# Patient Record
Sex: FEMALE | Race: WHITE | NOT HISPANIC OR LATINO | Employment: OTHER | URBAN - METROPOLITAN AREA
[De-identification: names, ages, dates, MRNs, and addresses within clinical notes are randomized per-mention and may not be internally consistent; named-entity substitution may affect disease eponyms.]

---

## 2017-01-06 ENCOUNTER — HOSPITAL ENCOUNTER (OUTPATIENT)
Dept: PHYSICAL THERAPY | Facility: HOSPITAL | Age: 67
Setting detail: RECURRING SERIES
Discharge: HOME | End: 2017-03-30
Attending: HOSPITALIST | Admitting: HOSPITALIST

## 2017-04-05 ENCOUNTER — HOSPITAL ENCOUNTER (OUTPATIENT)
Dept: PHYSICAL THERAPY | Facility: HOSPITAL | Age: 67
Setting detail: RECURRING SERIES
Discharge: HOME | End: 2017-05-21
Attending: HOSPITALIST | Admitting: HOSPITALIST

## 2017-05-30 ENCOUNTER — HOSPITAL ENCOUNTER (OUTPATIENT)
Dept: FAMILY MEDICINE CLINIC | Facility: CLINIC | Age: 67
Setting detail: SPECIMEN
Discharge: HOME | End: 2017-05-30
Attending: HOSPITALIST | Admitting: HOSPITALIST

## 2017-05-31 LAB
ALBUMIN SERPL-MCNC: 4.2 G/DL (ref 3.5–4.8)
ALBUMIN/GLOB SERPL: 1.9 {RATIO} (ref 1–1.7)
ALP SERPL-CCNC: 67 IU/L (ref 32–91)
ALT SERPL-CCNC: 32 IU/L (ref 14–54)
ANION GAP SERPL CALC-SCNC: 12 MMOL/L (ref 10–20)
AST SERPL-CCNC: 32 IU/L (ref 15–41)
BASOPHILS # BLD AUTO: 0 10*3/UL (ref 0–0.2)
BASOPHILS NFR BLD AUTO: 1 % (ref 0–2)
BILIRUB SERPL-MCNC: 0.6 MG/DL (ref 0.3–1.2)
BUN SERPL-MCNC: 16 MG/DL (ref 8–20)
BUN/CREAT SERPL: 20 (ref 5.4–26.2)
CALCIUM SERPL-MCNC: 8.9 MG/DL (ref 8.9–10.3)
CHLORIDE SERPL-SCNC: 101 MMOL/L (ref 101–111)
CHOLEST SERPL-MCNC: 142 MG/DL
CHOLEST/HDLC SERPL: 1.9 {RATIO}
CONV CO2: 28 MMOL/L (ref 22–32)
CONV LDL CHOLESTEROL DIRECT: 55 MG/DL (ref 0–100)
CONV TOTAL PROTEIN: 6.4 G/DL (ref 6.1–7.9)
CREAT UR-MCNC: 0.8 MG/DL (ref 0.4–1)
DIFFERENTIAL METHOD BLD: (no result)
EOSINOPHIL # BLD AUTO: 0.2 10*3/UL (ref 0–0.3)
EOSINOPHIL # BLD AUTO: 4 % (ref 0–3)
ERYTHROCYTE [DISTWIDTH] IN BLOOD BY AUTOMATED COUNT: 13.5 % (ref 11.5–14.5)
FOLATE SERPL-MCNC: 23 NG/ML (ref 5.9–24.8)
GLOBULIN UR ELPH-MCNC: 2.2 G/DL (ref 2.5–3.8)
GLUCOSE SERPL-MCNC: 98 MG/DL (ref 65–99)
HCT VFR BLD AUTO: 38.9 % (ref 35–49)
HDLC SERPL-MCNC: 76 MG/DL
HGB BLD-MCNC: 13.3 G/DL (ref 12–15)
LDLC/HDLC SERPL: 0.7 {RATIO}
LIPID INTERPRETATION: NORMAL
LYMPHOCYTES # BLD AUTO: 1.6 10*3/UL (ref 0.8–4.8)
LYMPHOCYTES NFR BLD AUTO: 33 % (ref 18–42)
MCH RBC QN AUTO: 32.2 PG (ref 26–32)
MCHC RBC AUTO-ENTMCNC: 34.1 G/DL (ref 32–36)
MCV RBC AUTO: 94.5 FL (ref 80–94)
MONOCYTES # BLD AUTO: 0.4 10*3/UL (ref 0.1–1.3)
MONOCYTES NFR BLD AUTO: 9 % (ref 2–11)
NEUTROPHILS # BLD AUTO: 2.6 10*3/UL (ref 2.3–8.6)
NEUTROPHILS NFR BLD AUTO: 53 % (ref 50–75)
NRBC BLD AUTO-RTO: 0 /100{WBCS}
NRBC/RBC NFR BLD MANUAL: 0 10*3/UL
PLATELET # BLD AUTO: 175 10*3/UL (ref 150–450)
PMV BLD AUTO: 8.3 FL (ref 7.4–10.4)
POTASSIUM SERPL-SCNC: 4 MMOL/L (ref 3.6–5.1)
RBC # BLD AUTO: 4.11 10*6/UL (ref 4–5.4)
SODIUM SERPL-SCNC: 137 MMOL/L (ref 136–144)
T3 SERPL-MCNC: 1.01 NG/ML (ref 0.87–1.78)
T4 FREE SERPL-MCNC: 0.86 NG/DL (ref 0.58–1.64)
TRIGL SERPL-MCNC: 25 MG/DL
TSH SERPL-ACNC: 1.08 UIU/ML (ref 0.34–5.6)
VIT B12 SERPL-MCNC: 387 PG/ML (ref 180–914)
VLDLC SERPL CALC-MCNC: 11.7 MG/DL
WBC # BLD AUTO: 4.8 10*3/UL (ref 4.5–11.5)

## 2018-08-16 ENCOUNTER — HOSPITAL ENCOUNTER (OUTPATIENT)
Dept: FAMILY MEDICINE CLINIC | Facility: CLINIC | Age: 68
Setting detail: SPECIMEN
Discharge: HOME | End: 2018-08-16
Attending: HOSPITALIST | Admitting: HOSPITALIST

## 2018-08-16 LAB
ALBUMIN SERPL-MCNC: 4.3 G/DL (ref 3.5–4.8)
ALBUMIN/GLOB SERPL: 1.7 {RATIO} (ref 1–1.7)
ALP SERPL-CCNC: 62 IU/L (ref 32–91)
ALT SERPL-CCNC: 23 IU/L (ref 14–54)
ANION GAP SERPL CALC-SCNC: 10.8 MMOL/L (ref 10–20)
AST SERPL-CCNC: 31 IU/L (ref 15–41)
BASOPHILS # BLD AUTO: 0 10*3/UL (ref 0–0.2)
BASOPHILS NFR BLD AUTO: 1 % (ref 0–2)
BILIRUB SERPL-MCNC: 0.7 MG/DL (ref 0.3–1.2)
BUN SERPL-MCNC: 10 MG/DL (ref 8–20)
BUN/CREAT SERPL: 12.5 (ref 5.4–26.2)
CALCIUM SERPL-MCNC: 9.7 MG/DL (ref 8.9–10.3)
CHLORIDE SERPL-SCNC: 102 MMOL/L (ref 101–111)
CHOLEST SERPL-MCNC: 157 MG/DL
CHOLEST/HDLC SERPL: 2 {RATIO}
CONV CO2: 30 MMOL/L (ref 22–32)
CONV LDL CHOLESTEROL DIRECT: 63 MG/DL (ref 0–100)
CONV TOTAL PROTEIN: 6.9 G/DL (ref 6.1–7.9)
CREAT UR-MCNC: 0.8 MG/DL (ref 0.4–1)
DIFFERENTIAL METHOD BLD: (no result)
EOSINOPHIL # BLD AUTO: 0.3 10*3/UL (ref 0–0.3)
EOSINOPHIL # BLD AUTO: 5 % (ref 0–3)
ERYTHROCYTE [DISTWIDTH] IN BLOOD BY AUTOMATED COUNT: 13.1 % (ref 11.5–14.5)
GLOBULIN UR ELPH-MCNC: 2.6 G/DL (ref 2.5–3.8)
GLUCOSE SERPL-MCNC: 66 MG/DL (ref 65–99)
HCT VFR BLD AUTO: 42.4 % (ref 35–49)
HDLC SERPL-MCNC: 79 MG/DL
HGB BLD-MCNC: 14.4 G/DL (ref 12–15)
LDLC/HDLC SERPL: 0.8 {RATIO}
LIPID INTERPRETATION: NORMAL
LYMPHOCYTES # BLD AUTO: 1.4 10*3/UL (ref 0.8–4.8)
LYMPHOCYTES NFR BLD AUTO: 29 % (ref 18–42)
MCH RBC QN AUTO: 32.5 PG (ref 26–32)
MCHC RBC AUTO-ENTMCNC: 33.9 G/DL (ref 32–36)
MCV RBC AUTO: 95.8 FL (ref 80–94)
MONOCYTES # BLD AUTO: 0.5 10*3/UL (ref 0.1–1.3)
MONOCYTES NFR BLD AUTO: 11 % (ref 2–11)
NEUTROPHILS # BLD AUTO: 2.8 10*3/UL (ref 2.3–8.6)
NEUTROPHILS NFR BLD AUTO: 54 % (ref 50–75)
NRBC BLD AUTO-RTO: 0 /100{WBCS}
NRBC/RBC NFR BLD MANUAL: 0 10*3/UL
PLATELET # BLD AUTO: 222 10*3/UL (ref 150–450)
PMV BLD AUTO: 8 FL (ref 7.4–10.4)
POTASSIUM SERPL-SCNC: 3.8 MMOL/L (ref 3.6–5.1)
RBC # BLD AUTO: 4.43 10*6/UL (ref 4–5.4)
SODIUM SERPL-SCNC: 139 MMOL/L (ref 136–144)
TRIGL SERPL-MCNC: 46 MG/DL
VLDLC SERPL CALC-MCNC: 14.4 MG/DL
WBC # BLD AUTO: 5 10*3/UL (ref 4.5–11.5)

## 2019-02-20 ENCOUNTER — HOSPITAL ENCOUNTER (OUTPATIENT)
Dept: CARDIOLOGY | Facility: HOSPITAL | Age: 69
Discharge: HOME | End: 2019-02-20
Attending: INTERNAL MEDICINE | Admitting: INTERNAL MEDICINE

## 2019-09-12 RX ORDER — FOLIC ACID 1 MG/1
TABLET ORAL
Qty: 90 TABLET | Refills: 2 | Status: SHIPPED | OUTPATIENT
Start: 2019-09-12 | End: 2020-04-07 | Stop reason: SDUPTHER

## 2019-10-30 ENCOUNTER — OFFICE VISIT (OUTPATIENT)
Dept: FAMILY MEDICINE CLINIC | Facility: CLINIC | Age: 69
End: 2019-10-30

## 2019-10-30 VITALS
DIASTOLIC BLOOD PRESSURE: 64 MMHG | TEMPERATURE: 97.7 F | HEART RATE: 63 BPM | WEIGHT: 129 LBS | SYSTOLIC BLOOD PRESSURE: 104 MMHG | RESPIRATION RATE: 16 BRPM | OXYGEN SATURATION: 96 % | BODY MASS INDEX: 20.82 KG/M2

## 2019-10-30 DIAGNOSIS — I25.10 CORONARY ARTERY DISEASE INVOLVING NATIVE CORONARY ARTERY OF NATIVE HEART WITHOUT ANGINA PECTORIS: Primary | ICD-10-CM

## 2019-10-30 PROCEDURE — 99213 OFFICE O/P EST LOW 20 MIN: CPT | Performed by: INTERNAL MEDICINE

## 2019-10-30 RX ORDER — FLUOXETINE HYDROCHLORIDE 20 MG/1
CAPSULE ORAL
COMMUNITY
Start: 2019-10-14 | End: 2019-11-08

## 2019-10-30 RX ORDER — POLYETHYLENE GLYCOL 3350 17 G/17G
17 POWDER, FOR SOLUTION ORAL
COMMUNITY

## 2019-10-30 RX ORDER — ATORVASTATIN CALCIUM 40 MG/1
TABLET, FILM COATED ORAL
COMMUNITY
Start: 2019-10-04 | End: 2020-04-28 | Stop reason: SDUPTHER

## 2019-11-07 PROBLEM — R01.1 HEART MURMUR: Status: ACTIVE | Noted: 2019-11-07

## 2019-11-07 PROBLEM — Z82.49 FAMILY HISTORY OF CORONARY ARTERY DISEASE: Status: ACTIVE | Noted: 2019-01-21

## 2019-11-07 PROBLEM — R07.89 CHEST PRESSURE: Status: ACTIVE | Noted: 2019-01-17

## 2019-11-07 PROBLEM — E78.5 HYPERLIPIDEMIA: Status: ACTIVE | Noted: 2019-11-07

## 2019-11-08 ENCOUNTER — OFFICE VISIT (OUTPATIENT)
Dept: CARDIOLOGY | Facility: CLINIC | Age: 69
End: 2019-11-08

## 2019-11-08 VITALS
WEIGHT: 128 LBS | HEIGHT: 66 IN | BODY MASS INDEX: 20.57 KG/M2 | DIASTOLIC BLOOD PRESSURE: 82 MMHG | SYSTOLIC BLOOD PRESSURE: 127 MMHG | HEART RATE: 74 BPM | OXYGEN SATURATION: 97 %

## 2019-11-08 DIAGNOSIS — E78.00 PURE HYPERCHOLESTEROLEMIA: Primary | ICD-10-CM

## 2019-11-08 DIAGNOSIS — I25.118 CORONARY ARTERY DISEASE OF NATIVE ARTERY OF NATIVE HEART WITH STABLE ANGINA PECTORIS (HCC): ICD-10-CM

## 2019-11-08 DIAGNOSIS — R94.31 ABNORMAL EKG: ICD-10-CM

## 2019-11-08 DIAGNOSIS — R07.9 CHEST PAIN, UNSPECIFIED TYPE: ICD-10-CM

## 2019-11-08 DIAGNOSIS — R01.1 HEART MURMUR: ICD-10-CM

## 2019-11-08 DIAGNOSIS — R06.02 SHORTNESS OF BREATH: ICD-10-CM

## 2019-11-08 PROCEDURE — 93000 ELECTROCARDIOGRAM COMPLETE: CPT | Performed by: INTERNAL MEDICINE

## 2019-11-08 PROCEDURE — 99204 OFFICE O/P NEW MOD 45 MIN: CPT | Performed by: INTERNAL MEDICINE

## 2019-11-08 NOTE — PROGRESS NOTES
"    Subjective:     Encounter Date:11/08/2019      Patient ID: Breanne Nicole is a 69 y.o. female.    Chief Complaint:  History of Present Illness 69-year-old white female with history of coronary disease hyperlipidemia history of Parkinson disease presents to my office for new consultation.  Patient has occasional episodes of chest pain and she is very nervous.  She also has been having some shortness of breath and dizziness.  No complaints of any palpitations syncope or swelling of the feet.  Patient had a calcium scoring test with a CT scan which was abnormal and hence she was referred to my office.  She also has history of Parkinson disease and using medications.    The following portions of the patient's history were reviewed and updated as appropriate: allergies, current medications, past family history, past medical history, past social history, past surgical history and problem list.  Past Medical History:   Diagnosis Date   • Coronary artery disease    • Heart murmur    • Hyperlipidemia      History reviewed. No pertinent surgical history.  /82 (BP Location: Left arm, Patient Position: Sitting)   Pulse 74   Ht 167.6 cm (66\")   Wt 58.1 kg (128 lb)   SpO2 97%   BMI 20.66 kg/m²   Family History   Problem Relation Age of Onset   • Heart disease Mother    • Heart disease Father    • Heart attack Father    • Heart disease Sister    • Heart attack Sister    • Heart attack Sister        Current Outpatient Medications:   •  atorvastatin (LIPITOR) 40 MG tablet, , Disp: , Rfl:   •  calcium carb-cholecalciferol 600-800 MG-UNIT tablet, Take 1 tablet by mouth., Disp: , Rfl:   •  carbidopa-levodopa (SINEMET)  MG per tablet, , Disp: , Rfl:   •  folic acid (FOLVITE) 1 MG tablet, TAKE ONE TABLET BY MOUTH DAILY, Disp: 90 tablet, Rfl: 2  •  polyethylene glycol (MIRALAX) packet, Take 17 g by mouth., Disp: , Rfl:   Allergies   Allergen Reactions   • Codeine Nausea Only   • Penicillins Diarrhea and Nausea And " Vomiting     Social History     Socioeconomic History   • Marital status:      Spouse name: Not on file   • Number of children: Not on file   • Years of education: Not on file   • Highest education level: Not on file   Tobacco Use   • Smoking status: Never Smoker   • Smokeless tobacco: Never Used     Review of Systems   Constitution: Negative for fever and malaise/fatigue.   HENT: Negative for ear pain and nosebleeds.    Eyes: Negative for blurred vision and double vision.   Cardiovascular: Positive for chest pain. Negative for dyspnea on exertion and palpitations.   Respiratory: Positive for shortness of breath. Negative for cough.    Skin: Negative for rash.   Musculoskeletal: Negative for joint pain.   Gastrointestinal: Positive for nausea. Negative for abdominal pain and vomiting.   Neurological: Positive for dizziness and light-headedness. Negative for focal weakness and headaches.   Psychiatric/Behavioral: Negative for depression. The patient is not nervous/anxious.    All other systems reviewed and are negative.             Objective:     Physical Exam   Constitutional: She appears well-developed and well-nourished.   HENT:   Head: Normocephalic and atraumatic.   Eyes: Conjunctivae and EOM are normal. Pupils are equal, round, and reactive to light. No scleral icterus.   Neck: Normal range of motion. Neck supple. No JVD present. Carotid bruit is not present.   Cardiovascular: Normal rate, regular rhythm, S1 normal, S2 normal, normal heart sounds and intact distal pulses. PMI is not displaced.   Pulmonary/Chest: Effort normal and breath sounds normal. She has no wheezes. She has no rales.   Abdominal: Soft. Bowel sounds are normal.   Musculoskeletal: Normal range of motion.   Neurological: She is alert. She has normal strength.   No focal deficits   Skin: Skin is warm and dry. No rash noted.   Psychiatric: She has a normal mood and affect.       ECG 12 Lead  Date/Time: 11/8/2019 3:14 PM  Performed by:  Zaire Ohara MD  Authorized by: Zaire Ohara MD   Comments: Sinus rhythm with left axis deviation and old septal MI no previous EKGs available            Lab Review:       Assessment:          Diagnosis Plan   1. Pure hypercholesterolemia     2. Coronary artery disease of native artery of native heart with stable angina pectoris (CMS/HCC)     3. Heart murmur     4. Shortness of breath     5. Chest pain, unspecified type     6. Abnormal EKG            Plan:       Patient has history of coronary disease in the past and now has a CT scan of the chest with calcium score being high  Patient with occasional episodes of chest pain shortness of breath which are nonspecific but also has a heart murmur  We will check an echocardiogram for LV function valve abnormalities  We will also perform a stress Myoview study.  Patient will have a Lexiscan Myoview study because she has Parkinson's disease  Patient has an abnormal EKG.  Patient lipids are followed by the primary care doctor

## 2019-11-12 ENCOUNTER — TELEPHONE (OUTPATIENT)
Dept: FAMILY MEDICINE CLINIC | Facility: CLINIC | Age: 69
End: 2019-11-12

## 2019-11-13 ENCOUNTER — HOSPITAL ENCOUNTER (OUTPATIENT)
Dept: CARDIOLOGY | Facility: HOSPITAL | Age: 69
Discharge: HOME | End: 2019-11-13

## 2019-11-13 ENCOUNTER — HOSPITAL ENCOUNTER (OUTPATIENT)
Dept: CARDIOLOGY | Facility: HOSPITAL | Age: 69
Discharge: HOME | End: 2019-11-13
Admitting: INTERNAL MEDICINE

## 2019-11-13 VITALS
WEIGHT: 125 LBS | SYSTOLIC BLOOD PRESSURE: 113 MMHG | DIASTOLIC BLOOD PRESSURE: 70 MMHG | HEIGHT: 66 IN | BODY MASS INDEX: 20.09 KG/M2

## 2019-11-13 DIAGNOSIS — E78.00 PURE HYPERCHOLESTEROLEMIA: ICD-10-CM

## 2019-11-13 DIAGNOSIS — R07.9 CHEST PAIN, UNSPECIFIED TYPE: ICD-10-CM

## 2019-11-13 DIAGNOSIS — R01.1 HEART MURMUR: ICD-10-CM

## 2019-11-13 DIAGNOSIS — I25.118 CORONARY ARTERY DISEASE OF NATIVE ARTERY OF NATIVE HEART WITH STABLE ANGINA PECTORIS (HCC): ICD-10-CM

## 2019-11-13 DIAGNOSIS — R94.31 ABNORMAL EKG: ICD-10-CM

## 2019-11-13 DIAGNOSIS — R06.02 SHORTNESS OF BREATH: ICD-10-CM

## 2019-11-13 LAB
BH CV ECHO MEAS - ACS: 2.2 CM
BH CV ECHO MEAS - AO MAX PG (FULL): 1.9 MMHG
BH CV ECHO MEAS - AO MAX PG: 4.5 MMHG
BH CV ECHO MEAS - AO MEAN PG (FULL): 1.1 MMHG
BH CV ECHO MEAS - AO MEAN PG: 2.7 MMHG
BH CV ECHO MEAS - AO ROOT AREA: 8.5 CM^2
BH CV ECHO MEAS - AO ROOT DIAM: 3.3 CM
BH CV ECHO MEAS - AO V2 MAX: 106 CM/SEC
BH CV ECHO MEAS - AO V2 MEAN: 79.7 CM/SEC
BH CV ECHO MEAS - AO V2 VTI: 23.9 CM
BH CV ECHO MEAS - ASC AORTA: 4.1 CM
BH CV ECHO MEAS - AVA(I,A): 2.5 CM^2
BH CV ECHO MEAS - AVA(I,D): 2.5 CM^2
BH CV ECHO MEAS - AVA(V,A): 2.6 CM^2
BH CV ECHO MEAS - AVA(V,D): 2.6 CM^2
BH CV ECHO MEAS - EDV(CUBED): 72 ML
BH CV ECHO MEAS - EDV(MOD-SP4): 43.9 ML
BH CV ECHO MEAS - EDV(TEICH): 76.9 ML
BH CV ECHO MEAS - EF(CUBED): 74.2 %
BH CV ECHO MEAS - EF(MOD-SP4): 62.5 %
BH CV ECHO MEAS - EF(TEICH): 66.5 %
BH CV ECHO MEAS - ESV(CUBED): 18.6 ML
BH CV ECHO MEAS - ESV(MOD-SP4): 16.5 ML
BH CV ECHO MEAS - ESV(TEICH): 25.8 ML
BH CV ECHO MEAS - FS: 36.4 %
BH CV ECHO MEAS - IVS/LVPW: 0.8
BH CV ECHO MEAS - IVSD: 0.69 CM
BH CV ECHO MEAS - LA DIMENSION: 4 CM
BH CV ECHO MEAS - LA/AO: 1.2
BH CV ECHO MEAS - LV MASS(C)D: 96 GRAMS
BH CV ECHO MEAS - LV MAX PG: 2.6 MMHG
BH CV ECHO MEAS - LV MEAN PG: 1.6 MMHG
BH CV ECHO MEAS - LV V1 MAX: 80.8 CM/SEC
BH CV ECHO MEAS - LV V1 MEAN: 60.4 CM/SEC
BH CV ECHO MEAS - LV V1 VTI: 18 CM
BH CV ECHO MEAS - LVIDD: 4.2 CM
BH CV ECHO MEAS - LVIDS: 2.6 CM
BH CV ECHO MEAS - LVOT AREA: 3.4 CM^2
BH CV ECHO MEAS - LVOT DIAM: 2.1 CM
BH CV ECHO MEAS - LVPWD: 0.87 CM
BH CV ECHO MEAS - MV A MAX VEL: 86.9 CM/SEC
BH CV ECHO MEAS - MV DEC SLOPE: 444.7 CM/SEC^2
BH CV ECHO MEAS - MV DEC TIME: 0.18 SEC
BH CV ECHO MEAS - MV E MAX VEL: 78.3 CM/SEC
BH CV ECHO MEAS - MV E/A: 0.9
BH CV ECHO MEAS - MV MAX PG: 4 MMHG
BH CV ECHO MEAS - MV MEAN PG: 2.1 MMHG
BH CV ECHO MEAS - MV V2 MAX: 100.2 CM/SEC
BH CV ECHO MEAS - MV V2 MEAN: 70.2 CM/SEC
BH CV ECHO MEAS - MV V2 VTI: 22.8 CM
BH CV ECHO MEAS - MVA(VTI): 2.6 CM^2
BH CV ECHO MEAS - PA ACC TIME: 0.11 SEC
BH CV ECHO MEAS - PA PR(ACCEL): 29.2 MMHG
BH CV ECHO MEAS - RAP SYSTOLE: 3 MMHG
BH CV ECHO MEAS - RV MAX PG: 1.5 MMHG
BH CV ECHO MEAS - RV MEAN PG: 1.1 MMHG
BH CV ECHO MEAS - RV V1 MAX: 60.3 CM/SEC
BH CV ECHO MEAS - RV V1 MEAN: 50.3 CM/SEC
BH CV ECHO MEAS - RV V1 VTI: 13.8 CM
BH CV ECHO MEAS - RVDD: 2.6 CM
BH CV ECHO MEAS - RVSP: 20.5 MMHG
BH CV ECHO MEAS - SV(AO): 204.3 ML
BH CV ECHO MEAS - SV(CUBED): 53.5 ML
BH CV ECHO MEAS - SV(LVOT): 60.3 ML
BH CV ECHO MEAS - SV(MOD-SP4): 27.5 ML
BH CV ECHO MEAS - SV(TEICH): 51.1 ML
BH CV ECHO MEAS - TR MAX VEL: 208.8 CM/SEC
BH CV NUCLEAR PRIOR STUDY: 3
BH CV STRESS COMMENTS STAGE 1: NORMAL
BH CV STRESS DOSE REGADENOSON STAGE 1: 0.4
BH CV STRESS DURATION MIN STAGE 1: 0
BH CV STRESS DURATION SEC STAGE 1: 10
BH CV STRESS PROTOCOL 1: NORMAL
BH CV STRESS RECOVERY BP: NORMAL MMHG
BH CV STRESS RECOVERY HR: 117 BPM
BH CV STRESS STAGE 1: 1
LV EF NUC BP: 73 %
MAXIMAL PREDICTED HEART RATE: 152 BPM
MAXIMAL PREDICTED HEART RATE: 152 BPM
PERCENT MAX PREDICTED HR: 80.26 %
STRESS BASELINE BP: NORMAL MMHG
STRESS BASELINE HR: 70 BPM
STRESS PERCENT HR: 94 %
STRESS POST PEAK BP: NORMAL MMHG
STRESS POST PEAK HR: 122 BPM
STRESS TARGET HR: 129 BPM
STRESS TARGET HR: 129 BPM

## 2019-11-13 PROCEDURE — 78452 HT MUSCLE IMAGE SPECT MULT: CPT

## 2019-11-13 PROCEDURE — 93016 CV STRESS TEST SUPVJ ONLY: CPT | Performed by: INTERNAL MEDICINE

## 2019-11-13 PROCEDURE — 25010000002 REGADENOSON 0.4 MG/5ML SOLUTION: Performed by: INTERNAL MEDICINE

## 2019-11-13 PROCEDURE — 93017 CV STRESS TEST TRACING ONLY: CPT

## 2019-11-13 PROCEDURE — 93306 TTE W/DOPPLER COMPLETE: CPT

## 2019-11-13 PROCEDURE — A9500 TC99M SESTAMIBI: HCPCS | Performed by: INTERNAL MEDICINE

## 2019-11-13 PROCEDURE — 0 TECHNETIUM SESTAMIBI: Performed by: INTERNAL MEDICINE

## 2019-11-13 PROCEDURE — 93018 CV STRESS TEST I&R ONLY: CPT | Performed by: INTERNAL MEDICINE

## 2019-11-13 PROCEDURE — 93306 TTE W/DOPPLER COMPLETE: CPT | Performed by: INTERNAL MEDICINE

## 2019-11-13 PROCEDURE — 78452 HT MUSCLE IMAGE SPECT MULT: CPT | Performed by: INTERNAL MEDICINE

## 2019-11-13 RX ADMIN — TECHNETIUM TC 99M SESTAMIBI 1 DOSE: 1 INJECTION INTRAVENOUS at 10:20

## 2019-11-13 RX ADMIN — TECHNETIUM TC 99M SESTAMIBI 1 DOSE: 1 INJECTION INTRAVENOUS at 08:00

## 2019-11-13 RX ADMIN — REGADENOSON 0.4 MG: 0.08 INJECTION, SOLUTION INTRAVENOUS at 10:20

## 2020-01-13 ENCOUNTER — OFFICE VISIT (OUTPATIENT)
Dept: FAMILY MEDICINE CLINIC | Facility: CLINIC | Age: 70
End: 2020-01-13

## 2020-01-13 VITALS
HEART RATE: 64 BPM | BODY MASS INDEX: 20.34 KG/M2 | SYSTOLIC BLOOD PRESSURE: 110 MMHG | TEMPERATURE: 98.5 F | WEIGHT: 126 LBS | RESPIRATION RATE: 8 BRPM | DIASTOLIC BLOOD PRESSURE: 70 MMHG

## 2020-01-13 DIAGNOSIS — J01.00 SUBACUTE MAXILLARY SINUSITIS: Primary | ICD-10-CM

## 2020-01-13 PROCEDURE — 99213 OFFICE O/P EST LOW 20 MIN: CPT | Performed by: NURSE PRACTITIONER

## 2020-01-13 RX ORDER — DOXYCYCLINE HYCLATE 100 MG
100 TABLET ORAL 2 TIMES DAILY
Qty: 20 TABLET | Refills: 0 | Status: SHIPPED | OUTPATIENT
Start: 2020-01-13 | End: 2020-01-23

## 2020-01-13 NOTE — PROGRESS NOTES
Subjective   Breanne Nicole is a 69 y.o. female.     Chief Complaint   Patient presents with   • URI       /70 (BP Location: Left arm, Patient Position: Sitting, Cuff Size: Adult)   Pulse 64   Temp 98.5 °F (36.9 °C) (Oral)   Resp 8   Wt 57.2 kg (126 lb)   BMI 20.34 kg/m²     BP Readings from Last 3 Encounters:   01/13/20 110/70   11/13/19 113/70   11/08/19 127/82       Wt Readings from Last 3 Encounters:   01/13/20 57.2 kg (126 lb)   11/13/19 56.7 kg (125 lb)   11/08/19 58.1 kg (128 lb)       Pt comes in today with c/o sinus pressure and pain. Symptoms started on 12/27. Was using flonase and humidifier at home. Symptoms improved after 3 days, but then returned. Has had 4 plane rides and the flying has aggravated symptoms. Sinus tenderness and pain on right side. Both maxillary and around right eye. Hasn't had much nasal drainage anymore. When she does will have thick mucous production in the morning. No cough. No fever or chills.  Has tried flonase and IBU.        The following portions of the patient's history were reviewed and updated as appropriate: allergies, current medications, past family history, past medical history, past social history, past surgical history and problem list.    Review of Systems   Constitutional: Negative for chills and fever.   HENT: Positive for congestion, postnasal drip and sinus pressure. Negative for ear pain, rhinorrhea, sneezing, sore throat and swollen glands.    Respiratory: Negative for cough, chest tightness, shortness of breath and wheezing.    Gastrointestinal: Negative for nausea and vomiting.   Neurological: Positive for headache.       Objective   Physical Exam   Constitutional: She is oriented to person, place, and time. She appears well-developed and well-nourished.   HENT:   Right Ear: Tympanic membrane normal.   Left Ear: Tympanic membrane normal.   Nose: Mucosal edema present. No rhinorrhea. Right sinus exhibits maxillary sinus tenderness and frontal sinus  tenderness. Left sinus exhibits maxillary sinus tenderness. Left sinus exhibits no frontal sinus tenderness.   Mouth/Throat: Oropharynx is clear and moist.   Eyes: Pupils are equal, round, and reactive to light.   Cardiovascular: Normal rate and regular rhythm.   Pulmonary/Chest: Effort normal and breath sounds normal.   Neurological: She is alert and oriented to person, place, and time.     Diagnoses and all orders for this visit:    1. Subacute maxillary sinusitis (Primary)  -     doxycycline (VIBRAMYICN) 100 MG tablet; Take 1 tablet by mouth 2 (Two) Times a Day for 10 days.  Dispense: 20 tablet; Refill: 0    start anbx  flonase otc  advil cold and sinus  During this office visit, we discussed the pertinent aspects of the visit and treatment recommendations. Pt verbalizes understanding. Follow up was discussed. Patient was given the opportunity to ask questions and discuss other concerns.     Return if symptoms worsen or fail to improve.

## 2020-02-06 ENCOUNTER — TELEPHONE (OUTPATIENT)
Dept: FAMILY MEDICINE CLINIC | Facility: CLINIC | Age: 70
End: 2020-02-06

## 2020-02-06 DIAGNOSIS — E78.5 HYPERLIPIDEMIA, UNSPECIFIED HYPERLIPIDEMIA TYPE: ICD-10-CM

## 2020-02-06 DIAGNOSIS — I25.10 CORONARY ARTERY DISEASE INVOLVING NATIVE CORONARY ARTERY OF NATIVE HEART WITHOUT ANGINA PECTORIS: ICD-10-CM

## 2020-02-06 DIAGNOSIS — C83.00 SMALL B-CELL LYMPHOMA, UNSPECIFIED BODY REGION (HCC): ICD-10-CM

## 2020-02-06 DIAGNOSIS — R53.83 FATIGUE, UNSPECIFIED TYPE: Primary | ICD-10-CM

## 2020-02-06 DIAGNOSIS — M85.80 OSTEOPENIA, UNSPECIFIED LOCATION: ICD-10-CM

## 2020-04-07 RX ORDER — FOLIC ACID 1 MG/1
1000 TABLET ORAL DAILY
Qty: 90 TABLET | Refills: 2 | Status: SHIPPED | OUTPATIENT
Start: 2020-04-07 | End: 2021-05-26 | Stop reason: ALTCHOICE

## 2020-04-15 ENCOUNTER — TELEPHONE (OUTPATIENT)
Dept: FAMILY MEDICINE CLINIC | Facility: CLINIC | Age: 70
End: 2020-04-15

## 2020-04-15 NOTE — TELEPHONE ENCOUNTER
----- Message from Breanne Nicole sent at 4/14/2020  8:40 PM EDT -----  Regarding: RE: Non-Urgent Medical Question  Contact: [phone]  Ok, I will do my 4-28 appointment with Dr. Rendon by e-visit and do blood work later. Thanks  ----- Message -----  From: Bobbi Rendon MD  Sent: 4/14/2020 10:37 AM EDT  To: Breanne Nicole  Subject: RE: Non-Urgent Medical Question  I understand.  We can do your wellness on an video visit at the same time on 4/28 if you want and then do the labs as soon as we can in the summer.  You just need to have mychart-- which you already do, and a smart phone or computer that we can do the video on if you want to do that still.  I would be happy to convert your appointment.    Dr. Rendon    ----- Message -----     From: Breanne Nicole     Sent: 4/14/2020 10:05 AM EDT       To: Bobbi Rendon MD  Subject: Non-Urgent Medical Question    I do not feel comfortable coming in for the routine blood work scheduled for 4-21. I also don't feel comfortable coming in for my wellness check on 4-28.  I can't be exposed because I need to stay well in order to care for grandchildren while their mom is having a baby. I guess the best thing is to reschedule for late July or August.

## 2020-04-28 ENCOUNTER — TELEMEDICINE (OUTPATIENT)
Dept: FAMILY MEDICINE CLINIC | Facility: CLINIC | Age: 70
End: 2020-04-28

## 2020-04-28 VITALS
WEIGHT: 120 LBS | BODY MASS INDEX: 19.37 KG/M2 | SYSTOLIC BLOOD PRESSURE: 114 MMHG | HEART RATE: 74 BPM | DIASTOLIC BLOOD PRESSURE: 79 MMHG

## 2020-04-28 DIAGNOSIS — G20 PARKINSON'S DISEASE (HCC): ICD-10-CM

## 2020-04-28 DIAGNOSIS — Z00.00 ENCOUNTER FOR GENERAL ADULT MEDICAL EXAMINATION WITHOUT ABNORMAL FINDINGS: Primary | ICD-10-CM

## 2020-04-28 DIAGNOSIS — E78.5 HYPERLIPIDEMIA, UNSPECIFIED HYPERLIPIDEMIA TYPE: ICD-10-CM

## 2020-04-28 PROCEDURE — G0439 PPPS, SUBSEQ VISIT: HCPCS | Performed by: INTERNAL MEDICINE

## 2020-04-28 RX ORDER — ATORVASTATIN CALCIUM 40 MG/1
40 TABLET, FILM COATED ORAL DAILY
Qty: 90 TABLET | Refills: 1 | Status: SHIPPED | OUTPATIENT
Start: 2020-04-28 | End: 2020-10-22

## 2020-04-28 RX ORDER — VITAMIN B COMPLEX
TABLET ORAL
COMMUNITY
Start: 2019-04-01 | End: 2021-05-26 | Stop reason: ALTCHOICE

## 2020-05-05 ENCOUNTER — TELEPHONE (OUTPATIENT)
Dept: FAMILY MEDICINE CLINIC | Facility: CLINIC | Age: 70
End: 2020-05-05

## 2020-05-05 NOTE — TELEPHONE ENCOUNTER
Pt needs her immunization record that shows when she had pertussis vaccine mailed to her home. Thank you.

## 2020-05-10 PROBLEM — G20.A1 PARKINSON'S DISEASE: Status: ACTIVE | Noted: 2017-05-30

## 2020-05-10 PROBLEM — K58.9 IRRITABLE BOWEL SYNDROME: Status: ACTIVE | Noted: 2020-05-10

## 2020-05-10 PROBLEM — G20 PARKINSON'S DISEASE: Status: ACTIVE | Noted: 2017-05-30

## 2020-05-10 PROBLEM — L71.9 ROSACEA: Status: ACTIVE | Noted: 2020-05-10

## 2020-05-10 PROBLEM — K59.00 CONSTIPATION: Status: ACTIVE | Noted: 2017-10-30

## 2020-05-10 PROBLEM — M81.0 OSTEOPOROSIS: Status: ACTIVE | Noted: 2020-05-10

## 2020-05-10 PROBLEM — M19.90 OSTEOARTHRITIS: Status: ACTIVE | Noted: 2020-05-10

## 2020-05-10 PROBLEM — T78.40XA ALLERGIC STATE: Status: ACTIVE | Noted: 2020-05-10

## 2020-09-29 ENCOUNTER — TELEPHONE (OUTPATIENT)
Dept: FAMILY MEDICINE CLINIC | Facility: CLINIC | Age: 70
End: 2020-09-29

## 2020-09-29 NOTE — TELEPHONE ENCOUNTER
[facility] with [facility].  Lab orders in.

## 2020-10-06 RX ORDER — PROPRANOLOL HYDROCHLORIDE 20 MG/1
20 TABLET ORAL 3 TIMES DAILY
COMMUNITY
Start: 2020-07-25 | End: 2022-02-16

## 2020-10-08 ENCOUNTER — LAB (OUTPATIENT)
Dept: FAMILY MEDICINE CLINIC | Facility: CLINIC | Age: 70
End: 2020-10-08

## 2020-10-08 DIAGNOSIS — R53.83 FATIGUE, UNSPECIFIED TYPE: ICD-10-CM

## 2020-10-08 DIAGNOSIS — E78.5 HYPERLIPIDEMIA, UNSPECIFIED HYPERLIPIDEMIA TYPE: ICD-10-CM

## 2020-10-08 DIAGNOSIS — C83.00 SMALL B-CELL LYMPHOMA, UNSPECIFIED BODY REGION (HCC): ICD-10-CM

## 2020-10-08 DIAGNOSIS — M85.80 OSTEOPENIA, UNSPECIFIED LOCATION: ICD-10-CM

## 2020-10-08 DIAGNOSIS — I25.10 CORONARY ARTERY DISEASE INVOLVING NATIVE CORONARY ARTERY OF NATIVE HEART WITHOUT ANGINA PECTORIS: ICD-10-CM

## 2020-10-08 LAB
25(OH)D3 SERPL-MCNC: 43.9 NG/ML (ref 30–100)
ALBUMIN SERPL-MCNC: 4.4 G/DL (ref 3.5–5.2)
ALBUMIN/GLOB SERPL: 1.8 G/DL
ALP SERPL-CCNC: 67 U/L (ref 39–117)
ALT SERPL W P-5'-P-CCNC: 6 U/L (ref 1–33)
ANION GAP SERPL CALCULATED.3IONS-SCNC: 7.2 MMOL/L (ref 5–15)
AST SERPL-CCNC: 29 U/L (ref 1–32)
BASOPHILS # BLD AUTO: 0.03 10*3/MM3 (ref 0–0.2)
BASOPHILS NFR BLD AUTO: 0.8 % (ref 0–1.5)
BILIRUB SERPL-MCNC: 0.4 MG/DL (ref 0–1.2)
BUN SERPL-MCNC: 15 MG/DL (ref 8–23)
BUN/CREAT SERPL: 21.1 (ref 7–25)
CALCIUM SPEC-SCNC: 9.2 MG/DL (ref 8.6–10.5)
CHLORIDE SERPL-SCNC: 102 MMOL/L (ref 98–107)
CHOLEST SERPL-MCNC: 146 MG/DL (ref 0–200)
CO2 SERPL-SCNC: 28.8 MMOL/L (ref 22–29)
CREAT SERPL-MCNC: 0.71 MG/DL (ref 0.57–1)
DEPRECATED RDW RBC AUTO: 41.7 FL (ref 37–54)
EOSINOPHIL # BLD AUTO: 0.32 10*3/MM3 (ref 0–0.4)
EOSINOPHIL NFR BLD AUTO: 8 % (ref 0.3–6.2)
ERYTHROCYTE [DISTWIDTH] IN BLOOD BY AUTOMATED COUNT: 11.8 % (ref 12.3–15.4)
GFR SERPL CREATININE-BSD FRML MDRD: 82 ML/MIN/1.73
GLOBULIN UR ELPH-MCNC: 2.4 GM/DL
GLUCOSE SERPL-MCNC: 87 MG/DL (ref 65–99)
HCT VFR BLD AUTO: 41.5 % (ref 34–46.6)
HDLC SERPL-MCNC: 79 MG/DL (ref 40–60)
HGB BLD-MCNC: 14.2 G/DL (ref 12–15.9)
IMM GRANULOCYTES # BLD AUTO: 0 10*3/MM3 (ref 0–0.05)
IMM GRANULOCYTES NFR BLD AUTO: 0 % (ref 0–0.5)
LDLC SERPL CALC-MCNC: 59 MG/DL (ref 0–100)
LDLC/HDLC SERPL: 0.77 {RATIO}
LYMPHOCYTES # BLD AUTO: 1.31 10*3/MM3 (ref 0.7–3.1)
LYMPHOCYTES NFR BLD AUTO: 32.9 % (ref 19.6–45.3)
MCH RBC QN AUTO: 33.1 PG (ref 26.6–33)
MCHC RBC AUTO-ENTMCNC: 34.2 G/DL (ref 31.5–35.7)
MCV RBC AUTO: 96.7 FL (ref 79–97)
MONOCYTES # BLD AUTO: 0.41 10*3/MM3 (ref 0.1–0.9)
MONOCYTES NFR BLD AUTO: 10.3 % (ref 5–12)
NEUTROPHILS NFR BLD AUTO: 1.91 10*3/MM3 (ref 1.7–7)
NEUTROPHILS NFR BLD AUTO: 48 % (ref 42.7–76)
NRBC BLD AUTO-RTO: 0 /100 WBC (ref 0–0.2)
PLATELET # BLD AUTO: 202 10*3/MM3 (ref 140–450)
PMV BLD AUTO: 10.2 FL (ref 6–12)
POTASSIUM SERPL-SCNC: 4.1 MMOL/L (ref 3.5–5.2)
PROT SERPL-MCNC: 6.8 G/DL (ref 6–8.5)
RBC # BLD AUTO: 4.29 10*6/MM3 (ref 3.77–5.28)
SODIUM SERPL-SCNC: 138 MMOL/L (ref 136–145)
TRIGL SERPL-MCNC: 29 MG/DL (ref 0–150)
TSH SERPL DL<=0.05 MIU/L-ACNC: 1.8 UIU/ML (ref 0.27–4.2)
VLDLC SERPL-MCNC: 8 MG/DL (ref 5–40)
WBC # BLD AUTO: 3.98 10*3/MM3 (ref 3.4–10.8)

## 2020-10-08 PROCEDURE — 85025 COMPLETE CBC W/AUTO DIFF WBC: CPT | Performed by: INTERNAL MEDICINE

## 2020-10-08 PROCEDURE — 36415 COLL VENOUS BLD VENIPUNCTURE: CPT

## 2020-10-08 PROCEDURE — 80061 LIPID PANEL: CPT | Performed by: INTERNAL MEDICINE

## 2020-10-08 PROCEDURE — 84443 ASSAY THYROID STIM HORMONE: CPT | Performed by: INTERNAL MEDICINE

## 2020-10-08 PROCEDURE — 80053 COMPREHEN METABOLIC PANEL: CPT | Performed by: INTERNAL MEDICINE

## 2020-10-08 PROCEDURE — 82306 VITAMIN D 25 HYDROXY: CPT | Performed by: INTERNAL MEDICINE

## 2020-10-21 ENCOUNTER — PATIENT MESSAGE (OUTPATIENT)
Dept: FAMILY MEDICINE CLINIC | Facility: CLINIC | Age: 70
End: 2020-10-21

## 2020-10-21 ENCOUNTER — OFFICE VISIT (OUTPATIENT)
Dept: CARDIOLOGY | Facility: CLINIC | Age: 70
End: 2020-10-21

## 2020-10-21 VITALS
DIASTOLIC BLOOD PRESSURE: 73 MMHG | SYSTOLIC BLOOD PRESSURE: 107 MMHG | OXYGEN SATURATION: 98 % | BODY MASS INDEX: 18.96 KG/M2 | WEIGHT: 118 LBS | HEIGHT: 66 IN | HEART RATE: 73 BPM

## 2020-10-21 DIAGNOSIS — I25.118 CORONARY ARTERY DISEASE OF NATIVE ARTERY OF NATIVE HEART WITH STABLE ANGINA PECTORIS (HCC): ICD-10-CM

## 2020-10-21 DIAGNOSIS — E78.5 HYPERLIPIDEMIA, UNSPECIFIED HYPERLIPIDEMIA TYPE: Primary | ICD-10-CM

## 2020-10-21 DIAGNOSIS — E78.00 PURE HYPERCHOLESTEROLEMIA: Primary | ICD-10-CM

## 2020-10-21 PROCEDURE — 99213 OFFICE O/P EST LOW 20 MIN: CPT | Performed by: INTERNAL MEDICINE

## 2020-10-21 NOTE — PROGRESS NOTES
"    Subjective:     Encounter Date:10/21/2020      Patient ID: Breanne Nicole is a 70 y.o. female.    Chief Complaint:  History of Present Illness 70-year-old white female with history of coronary disease hyperlipidemia presents to my office for follow-up.  Pain is currently stable without evidence of chest pain or shortness of breath at rest on exertion.  No complains of any PND or orthopnea.  She has occasional palpitation without any dizziness or syncope.  She is taking her medicines regularly.  She does not smoke.  She is trying to exercise regularly she follows a good diet.    The following portions of the patient's history were reviewed and updated as appropriate: allergies, current medications, past family history, past medical history, past social history, past surgical history and problem list.  Past Medical History:   Diagnosis Date   • Anxiety Better    Quit taking meds   • Cancer (CMS/HCC)     Cancer free 5 years   • Coronary artery disease    • Heart murmur    • Hyperlipidemia    • Osteopenia     ?   • Parkinson's disease (CMS/HCC)    • Tremor     PD     Past Surgical History:   Procedure Laterality Date   • LYMPH NODE BIOPSY  2015     /73 (BP Location: Left arm, Patient Position: Sitting)   Pulse 73   Ht 167.6 cm (66\")   Wt 53.5 kg (118 lb)   SpO2 98%   BMI 19.05 kg/m²   Family History   Problem Relation Age of Onset   • Heart disease Mother         CHF, heart attacks   • Heart disease Father         Heart desease   • Heart attack Father    • Heart disease Sister         Heart attack age60   • Heart attack Sister    • Heart attack Sister    • Heart disease Sister        Current Outpatient Medications:   •  atorvastatin (LIPITOR) 40 MG tablet, Take 1 tablet by mouth Daily., Disp: 90 tablet, Rfl: 1  •  B Complex Vitamins (B-COMPLEX/B-12) tablet, , Disp: , Rfl:   •  calcium carb-cholecalciferol 600-800 MG-UNIT tablet, Take 1 tablet by mouth., Disp: , Rfl:   •  carbidopa-levodopa (SINEMET) "  MG per tablet, , Disp: , Rfl:   •  folic acid (FOLVITE) 1 MG tablet, Take 1 tablet by mouth Daily., Disp: 90 tablet, Rfl: 2  •  Magnesium 100 MG capsule, Take  by mouth., Disp: , Rfl:   •  polyethylene glycol (MIRALAX) packet, Take 17 g by mouth., Disp: , Rfl:   •  propranolol (INDERAL) 20 MG tablet, Takes 1/2 tablet up to 3 times a day as needed, Disp: , Rfl:   Allergies   Allergen Reactions   • Codeine Nausea Only   • Penicillins Diarrhea and Nausea And Vomiting     Social History     Socioeconomic History   • Marital status:      Spouse name: Not on file   • Number of children: Not on file   • Years of education: Not on file   • Highest education level: Not on file   Tobacco Use   • Smoking status: Never Smoker   • Smokeless tobacco: Never Used   Substance and Sexual Activity   • Alcohol use: Yes     Frequency: Monthly or less     Comment: 3oz 3-4 days/week   • Drug use: Never     Review of Systems   Constitution: Negative for fever and malaise/fatigue.   Cardiovascular: Positive for palpitations. Negative for chest pain and dyspnea on exertion.   Respiratory: Negative for cough and shortness of breath.    Skin: Negative for rash.   Gastrointestinal: Negative for abdominal pain, nausea and vomiting.   Neurological: Negative for dizziness, focal weakness, headaches and light-headedness.   All other systems reviewed and are negative.             Objective:     Constitutional:       Appearance: Well-developed.   Eyes:      General: No scleral icterus.     Conjunctiva/sclera: Conjunctivae normal.   HENT:      Head: Normocephalic and atraumatic.   Neck:      Musculoskeletal: Normal range of motion and neck supple.      Vascular: No carotid bruit or JVD.   Pulmonary:      Effort: Pulmonary effort is normal.      Breath sounds: Normal breath sounds. No wheezing. No rales.   Cardiovascular:      Normal rate. Regular rhythm.   Pulses:     Intact distal pulses.   Abdominal:      General: Bowel sounds are  normal.      Palpations: Abdomen is soft.   Skin:     General: Skin is warm and dry.      Findings: No rash.   Neurological:      Mental Status: Alert.       Procedures    Lab Review:       Assessment:          Diagnosis Plan   1. Pure hypercholesterolemia     2. Coronary artery disease of native artery of native heart with stable angina pectoris (CMS/HCC)            Plan:       Patient has nonobstructive coronary artery disease with normal LV function is currently stable on medical therapy  Patient has hyperlipidemia and is on a statin and her lipid levels are followed by the primary care doctor  Patient has anxiety disorder and has occasional palpitations but is currently on beta-blockers  Continue current medicines and follow in 6 months

## 2020-10-22 RX ORDER — ATORVASTATIN CALCIUM 20 MG/1
20 TABLET, FILM COATED ORAL DAILY
Qty: 90 TABLET | Refills: 1 | Status: SHIPPED | OUTPATIENT
Start: 2020-10-22 | End: 2021-03-23

## 2020-10-22 NOTE — TELEPHONE ENCOUNTER
From: Breanne Nicole  To: Bobbi Rendon MD  Sent: 10/21/2020 11:09 AM EDT  Subject: Test Results Question    Dr. Ohara reviewed my recent blood work results. He suggested that I ask you about cutting Atoravastitin to 20 mg instead of 40mg. He suggested I get the cholesterol tests redrawn in 6 mo. Can you order a retest on or before May 20? Thank you.

## 2021-01-22 RX ORDER — ATORVASTATIN CALCIUM 40 MG/1
TABLET, FILM COATED ORAL
Qty: 90 TABLET | Refills: 0 | OUTPATIENT
Start: 2021-01-22

## 2021-03-23 RX ORDER — ATORVASTATIN CALCIUM 20 MG/1
TABLET, FILM COATED ORAL
Qty: 90 TABLET | Refills: 2 | Status: SHIPPED | OUTPATIENT
Start: 2021-03-23 | End: 2021-05-26 | Stop reason: ALTCHOICE

## 2021-04-09 ENCOUNTER — LAB (OUTPATIENT)
Dept: FAMILY MEDICINE CLINIC | Facility: CLINIC | Age: 71
End: 2021-04-09

## 2021-04-09 DIAGNOSIS — E78.5 HYPERLIPIDEMIA, UNSPECIFIED HYPERLIPIDEMIA TYPE: ICD-10-CM

## 2021-04-09 LAB
ALBUMIN SERPL-MCNC: 4.5 G/DL (ref 3.5–5.2)
ALBUMIN/GLOB SERPL: 2.3 G/DL
ALP SERPL-CCNC: 58 U/L (ref 39–117)
ALT SERPL W P-5'-P-CCNC: 5 U/L (ref 1–33)
ANION GAP SERPL CALCULATED.3IONS-SCNC: 9.2 MMOL/L (ref 5–15)
AST SERPL-CCNC: 25 U/L (ref 1–32)
BILIRUB SERPL-MCNC: 0.6 MG/DL (ref 0–1.2)
BUN SERPL-MCNC: 16 MG/DL (ref 8–23)
BUN/CREAT SERPL: 23.2 (ref 7–25)
CALCIUM SPEC-SCNC: 9.5 MG/DL (ref 8.6–10.5)
CHLORIDE SERPL-SCNC: 102 MMOL/L (ref 98–107)
CHOLEST SERPL-MCNC: 150 MG/DL (ref 0–200)
CO2 SERPL-SCNC: 30.8 MMOL/L (ref 22–29)
CREAT SERPL-MCNC: 0.69 MG/DL (ref 0.57–1)
GFR SERPL CREATININE-BSD FRML MDRD: 84 ML/MIN/1.73
GLOBULIN UR ELPH-MCNC: 2 GM/DL
GLUCOSE SERPL-MCNC: 85 MG/DL (ref 65–99)
HDLC SERPL-MCNC: 84 MG/DL (ref 40–60)
LDLC SERPL CALC-MCNC: 55 MG/DL (ref 0–100)
LDLC/HDLC SERPL: 0.67 {RATIO}
POTASSIUM SERPL-SCNC: 4 MMOL/L (ref 3.5–5.2)
PROT SERPL-MCNC: 6.5 G/DL (ref 6–8.5)
SODIUM SERPL-SCNC: 142 MMOL/L (ref 136–145)
TRIGL SERPL-MCNC: 47 MG/DL (ref 0–150)
VLDLC SERPL-MCNC: 11 MG/DL (ref 5–40)

## 2021-04-09 PROCEDURE — 80061 LIPID PANEL: CPT | Performed by: INTERNAL MEDICINE

## 2021-04-09 PROCEDURE — 80053 COMPREHEN METABOLIC PANEL: CPT | Performed by: INTERNAL MEDICINE

## 2021-05-14 RX ORDER — ESCITALOPRAM OXALATE 10 MG/1
30 TABLET ORAL DAILY
COMMUNITY
Start: 2021-03-15

## 2021-05-26 ENCOUNTER — OFFICE VISIT (OUTPATIENT)
Dept: CARDIOLOGY | Facility: CLINIC | Age: 71
End: 2021-05-26

## 2021-05-26 VITALS
HEART RATE: 62 BPM | SYSTOLIC BLOOD PRESSURE: 103 MMHG | HEIGHT: 66 IN | BODY MASS INDEX: 20.57 KG/M2 | OXYGEN SATURATION: 97 % | DIASTOLIC BLOOD PRESSURE: 66 MMHG | WEIGHT: 128 LBS

## 2021-05-26 DIAGNOSIS — E78.00 PURE HYPERCHOLESTEROLEMIA: Primary | ICD-10-CM

## 2021-05-26 DIAGNOSIS — I25.118 CORONARY ARTERY DISEASE OF NATIVE ARTERY OF NATIVE HEART WITH STABLE ANGINA PECTORIS (HCC): ICD-10-CM

## 2021-05-26 DIAGNOSIS — R01.1 HEART MURMUR: ICD-10-CM

## 2021-05-26 PROCEDURE — 99213 OFFICE O/P EST LOW 20 MIN: CPT | Performed by: INTERNAL MEDICINE

## 2021-05-26 RX ORDER — ATORVASTATIN CALCIUM 10 MG/1
20 TABLET, FILM COATED ORAL DAILY
COMMUNITY
End: 2021-12-02 | Stop reason: SDUPTHER

## 2021-05-26 NOTE — PROGRESS NOTES
"    Subjective:     Encounter Date:05/26/2021      Patient ID: Breanne Nicole is a 71 y.o. female.    Chief Complaint:  History of Present Illness 71-year-old white female with history of coronary disease hyperlipidemia and history of heart murmur and Parkinson's disease presents to office for follow-up.  Patient is currently stable without any symptoms of chest pain or shortness of breath at rest or exertion.  No complaints any PND orthopnea.  No palpitation dizziness syncope or swelling of the feet.  Patient has been taking all the medicines regularly.  Patient does not smoke.  Patient is trying to exercise regularly she follows a good diet    The following portions of the patient's history were reviewed and updated as appropriate: allergies, current medications, past family history, past medical history, past social history, past surgical history and problem list.  Past Medical History:   Diagnosis Date   • Anxiety Better    Quit taking meds   • Cancer (CMS/HCC)     Cancer free 5 years   • Coronary artery disease    • Heart murmur    • Hyperlipidemia    • Osteopenia     ?   • Parkinson's disease (CMS/HCC)    • Tremor     PD     Past Surgical History:   Procedure Laterality Date   • LYMPH NODE BIOPSY  2015     /66   Pulse 62   Ht 167.6 cm (66\")   Wt 58.1 kg (128 lb)   SpO2 97%   BMI 20.66 kg/m²   Family History   Problem Relation Age of Onset   • Heart disease Mother         CHF, heart attacks   • Heart disease Father         Heart desease   • Heart attack Father    • Heart disease Sister         Heart attack age58   • Heart attack Sister    • Heart attack Sister    • Heart disease Sister        Current Outpatient Medications:   •  atorvastatin (LIPITOR) 10 MG tablet, Take 10 mg by mouth Daily., Disp: , Rfl:   •  calcium carb-cholecalciferol 600-800 MG-UNIT tablet, Take 1 tablet by mouth., Disp: , Rfl:   •  carbidopa-levodopa (SINEMET)  MG per tablet, , Disp: , Rfl:   •  escitalopram (LEXAPRO) 10 " MG tablet, , Disp: , Rfl:   •  polyethylene glycol (MIRALAX) packet, Take 17 g by mouth., Disp: , Rfl:   •  propranolol (INDERAL) 20 MG tablet, 20 mg 3 (Three) Times a Day., Disp: , Rfl:   Allergies   Allergen Reactions   • Codeine Nausea Only   • Penicillins Diarrhea and Nausea And Vomiting     Social History     Socioeconomic History   • Marital status:      Spouse name: Not on file   • Number of children: Not on file   • Years of education: Not on file   • Highest education level: Not on file   Tobacco Use   • Smoking status: Never Smoker   • Smokeless tobacco: Never Used   Substance and Sexual Activity   • Alcohol use: Yes     Comment: 3oz 3-4 days/week   • Drug use: Never     Review of Systems   Constitutional: Negative for fever and malaise/fatigue.   Cardiovascular: Negative for chest pain (w/ stress), dyspnea on exertion, leg swelling and palpitations.   Respiratory: Negative for cough and shortness of breath.    Skin: Negative for rash.   Gastrointestinal: Negative for abdominal pain, nausea and vomiting.   Neurological: Negative for focal weakness, headaches, light-headedness and numbness.   All other systems reviewed and are negative.             Objective:     Constitutional:       Appearance: Well-developed.   Eyes:      General: No scleral icterus.     Conjunctiva/sclera: Conjunctivae normal.   HENT:      Head: Normocephalic and atraumatic.   Neck:      Vascular: No carotid bruit or JVD.   Pulmonary:      Effort: Pulmonary effort is normal.      Breath sounds: Normal breath sounds. No wheezing. No rales.   Cardiovascular:      Normal rate. Regular rhythm.   Pulses:     Intact distal pulses.   Abdominal:      General: Bowel sounds are normal.      Palpations: Abdomen is soft.   Musculoskeletal:      Cervical back: Normal range of motion and neck supple. Skin:     General: Skin is warm and dry.      Findings: No rash.   Neurological:      Mental Status: Alert.       Procedures    Lab Review:          MDM  1.  Coronary disease  Patient has nonobstructive disease in the past and is normal with function is currently stable on medications  2.  Heart murmur  Patient has mild mitral regurgitation mild tricuspid gestation with normal V function is currently stable  3.  Hyperlipidemia  Patient is currently on statins and her lipid levels are followed by the primary care doctor.

## 2021-07-08 DIAGNOSIS — M80.00XA AGE-RELATED OSTEOPOROSIS WITH CURRENT PATHOLOGICAL FRACTURE, INITIAL ENCOUNTER: Primary | ICD-10-CM

## 2021-10-19 ENCOUNTER — PATIENT MESSAGE (OUTPATIENT)
Dept: FAMILY MEDICINE CLINIC | Facility: CLINIC | Age: 71
End: 2021-10-19

## 2021-10-19 RX ORDER — BISACODYL 10 MG
10 SUPPOSITORY, RECTAL RECTAL DAILY
Qty: 2 SUPPOSITORY | Refills: 1 | Status: SHIPPED | OUTPATIENT
Start: 2021-10-19 | End: 2022-02-16

## 2021-10-20 NOTE — TELEPHONE ENCOUNTER
From: Breanne Nicole  To: Bobbi Rendon MD  Sent: 10/19/2021 8:12 PM EDT  Subject: Non-Urgent Medical Question    I have chronic constipation that I treat with miralax 2 times daily. For 2weeks now I've been struggling with little results. Now I haven't been able to pass anything even though I've taken 500 mg of milk of magnesia 3 times and 64 oz of water and walking. Help please.

## 2021-10-22 ENCOUNTER — PATIENT MESSAGE (OUTPATIENT)
Dept: FAMILY MEDICINE CLINIC | Facility: CLINIC | Age: 71
End: 2021-10-22

## 2021-10-22 NOTE — TELEPHONE ENCOUNTER
From: Breanne Nicole  To: Bobbi Rendon MD  Sent: 10/22/2021 9:32 AM EDT  Subject: constipation     The treatment helped with elimination but I'm concerned that I lack the urge to keep things moving forward. Miralax 2X daily doesn't work as well now. I'm not even sure I eliminated the block. What can I add to my regimine?

## 2021-10-25 ENCOUNTER — PATIENT MESSAGE (OUTPATIENT)
Dept: FAMILY MEDICINE CLINIC | Facility: CLINIC | Age: 71
End: 2021-10-25

## 2021-10-25 DIAGNOSIS — K59.00 CONSTIPATION, UNSPECIFIED CONSTIPATION TYPE: Primary | ICD-10-CM

## 2021-10-27 ENCOUNTER — HOSPITAL ENCOUNTER (OUTPATIENT)
Dept: GENERAL RADIOLOGY | Facility: HOSPITAL | Age: 71
Discharge: HOME | End: 2021-10-27
Admitting: INTERNAL MEDICINE

## 2021-10-27 DIAGNOSIS — K59.00 CONSTIPATION, UNSPECIFIED CONSTIPATION TYPE: ICD-10-CM

## 2021-10-27 PROCEDURE — 74018 RADEX ABDOMEN 1 VIEW: CPT

## 2021-10-27 NOTE — PROGRESS NOTES
No signs of worry for obstruction- please get a magnesium citrate bottle at the grocery and drink that today to clean out colon

## 2021-10-27 NOTE — TELEPHONE ENCOUNTER
From: Breanne Nicole  To: Bobbi Rendon MD  Sent: 10/25/2021 8:52 PM EDT  Subject: Unresolved constipation    My constipation has not resolved despite aggressive measures. I'm concerned it is more than just constipation. I need to see you or a specialist as soon as possible.

## 2021-10-30 ENCOUNTER — HOSPITAL ENCOUNTER (EMERGENCY)
Facility: HOSPITAL | Age: 71
Discharge: HOME | End: 2021-10-30
Attending: EMERGENCY MEDICINE | Admitting: EMERGENCY MEDICINE

## 2021-10-30 ENCOUNTER — APPOINTMENT (OUTPATIENT)
Dept: GENERAL RADIOLOGY | Facility: HOSPITAL | Age: 71
End: 2021-10-30

## 2021-10-30 ENCOUNTER — APPOINTMENT (OUTPATIENT)
Dept: CT IMAGING | Facility: HOSPITAL | Age: 71
End: 2021-10-30

## 2021-10-30 VITALS
HEART RATE: 74 BPM | OXYGEN SATURATION: 97 % | BODY MASS INDEX: 21.12 KG/M2 | WEIGHT: 131.39 LBS | HEIGHT: 66 IN | DIASTOLIC BLOOD PRESSURE: 61 MMHG | RESPIRATION RATE: 17 BRPM | TEMPERATURE: 97.2 F | SYSTOLIC BLOOD PRESSURE: 112 MMHG

## 2021-10-30 DIAGNOSIS — K59.00 CONSTIPATION, UNSPECIFIED CONSTIPATION TYPE: Primary | ICD-10-CM

## 2021-10-30 DIAGNOSIS — R07.9 CHEST PAIN, UNSPECIFIED TYPE: ICD-10-CM

## 2021-10-30 LAB
ANION GAP SERPL CALCULATED.3IONS-SCNC: 14 MMOL/L (ref 5–15)
APTT PPP: 24.6 SECONDS (ref 24–31)
BASOPHILS # BLD AUTO: 0 10*3/MM3 (ref 0–0.2)
BASOPHILS NFR BLD AUTO: 0.8 % (ref 0–1.5)
BUN SERPL-MCNC: 12 MG/DL (ref 8–23)
BUN/CREAT SERPL: 18.8 (ref 7–25)
CALCIUM SPEC-SCNC: 8.8 MG/DL (ref 8.6–10.5)
CHLORIDE SERPL-SCNC: 93 MMOL/L (ref 98–107)
CO2 SERPL-SCNC: 24 MMOL/L (ref 22–29)
CREAT SERPL-MCNC: 0.64 MG/DL (ref 0.57–1)
DEPRECATED RDW RBC AUTO: 43.3 FL (ref 37–54)
EOSINOPHIL # BLD AUTO: 0.2 10*3/MM3 (ref 0–0.4)
EOSINOPHIL NFR BLD AUTO: 4.1 % (ref 0.3–6.2)
ERYTHROCYTE [DISTWIDTH] IN BLOOD BY AUTOMATED COUNT: 12.9 % (ref 12.3–15.4)
GFR SERPL CREATININE-BSD FRML MDRD: 92 ML/MIN/1.73
GLUCOSE SERPL-MCNC: 90 MG/DL (ref 65–99)
HCT VFR BLD AUTO: 38.6 % (ref 34–46.6)
HGB BLD-MCNC: 13.2 G/DL (ref 12–15.9)
INR PPP: 1.08 (ref 0.93–1.1)
LYMPHOCYTES # BLD AUTO: 1.7 10*3/MM3 (ref 0.7–3.1)
LYMPHOCYTES NFR BLD AUTO: 36.4 % (ref 19.6–45.3)
MCH RBC QN AUTO: 32.9 PG (ref 26.6–33)
MCHC RBC AUTO-ENTMCNC: 34.2 G/DL (ref 31.5–35.7)
MCV RBC AUTO: 96.1 FL (ref 79–97)
MONOCYTES # BLD AUTO: 0.4 10*3/MM3 (ref 0.1–0.9)
MONOCYTES NFR BLD AUTO: 8.7 % (ref 5–12)
NEUTROPHILS NFR BLD AUTO: 2.3 10*3/MM3 (ref 1.7–7)
NEUTROPHILS NFR BLD AUTO: 50 % (ref 42.7–76)
NRBC BLD AUTO-RTO: 0.5 /100 WBC (ref 0–0.2)
PLATELET # BLD AUTO: 198 10*3/MM3 (ref 140–450)
PMV BLD AUTO: 7.5 FL (ref 6–12)
POTASSIUM SERPL-SCNC: 3.6 MMOL/L (ref 3.5–5.2)
PROTHROMBIN TIME: 11.9 SECONDS (ref 9.6–11.7)
RBC # BLD AUTO: 4.02 10*6/MM3 (ref 3.77–5.28)
SODIUM SERPL-SCNC: 131 MMOL/L (ref 136–145)
TROPONIN T SERPL-MCNC: <0.01 NG/ML (ref 0–0.03)
WBC # BLD AUTO: 4.6 10*3/MM3 (ref 3.4–10.8)

## 2021-10-30 PROCEDURE — 93005 ELECTROCARDIOGRAM TRACING: CPT | Performed by: EMERGENCY MEDICINE

## 2021-10-30 PROCEDURE — 74177 CT ABD & PELVIS W/CONTRAST: CPT

## 2021-10-30 PROCEDURE — 84484 ASSAY OF TROPONIN QUANT: CPT | Performed by: EMERGENCY MEDICINE

## 2021-10-30 PROCEDURE — 0 IOPAMIDOL PER 1 ML: Performed by: EMERGENCY MEDICINE

## 2021-10-30 PROCEDURE — 85730 THROMBOPLASTIN TIME PARTIAL: CPT | Performed by: EMERGENCY MEDICINE

## 2021-10-30 PROCEDURE — 99283 EMERGENCY DEPT VISIT LOW MDM: CPT

## 2021-10-30 PROCEDURE — 80048 BASIC METABOLIC PNL TOTAL CA: CPT | Performed by: EMERGENCY MEDICINE

## 2021-10-30 PROCEDURE — 85610 PROTHROMBIN TIME: CPT | Performed by: EMERGENCY MEDICINE

## 2021-10-30 PROCEDURE — 71045 X-RAY EXAM CHEST 1 VIEW: CPT

## 2021-10-30 PROCEDURE — 85025 COMPLETE CBC W/AUTO DIFF WBC: CPT | Performed by: EMERGENCY MEDICINE

## 2021-10-30 RX ORDER — SODIUM CHLORIDE 0.9 % (FLUSH) 0.9 %
10 SYRINGE (ML) INJECTION AS NEEDED
Status: DISCONTINUED | OUTPATIENT
Start: 2021-10-30 | End: 2021-10-31 | Stop reason: HOSPADM

## 2021-10-30 RX ADMIN — IOPAMIDOL 100 ML: 755 INJECTION, SOLUTION INTRAVENOUS at 20:47

## 2021-10-31 LAB — QT INTERVAL: 445 MS

## 2021-11-02 ENCOUNTER — TELEPHONE (OUTPATIENT)
Dept: CARDIOLOGY | Facility: CLINIC | Age: 71
End: 2021-11-02

## 2021-11-02 NOTE — TELEPHONE ENCOUNTER
Patient requesting you to look at her EKG from 10/31 done at [facility]. [phone]

## 2021-11-03 ENCOUNTER — OFFICE VISIT (OUTPATIENT)
Dept: FAMILY MEDICINE CLINIC | Facility: CLINIC | Age: 71
End: 2021-11-03

## 2021-11-03 VITALS
WEIGHT: 129 LBS | RESPIRATION RATE: 18 BRPM | TEMPERATURE: 97.1 F | OXYGEN SATURATION: 98 % | HEART RATE: 63 BPM | DIASTOLIC BLOOD PRESSURE: 78 MMHG | HEIGHT: 66 IN | BODY MASS INDEX: 20.73 KG/M2 | SYSTOLIC BLOOD PRESSURE: 110 MMHG

## 2021-11-03 DIAGNOSIS — R07.9 CHEST PAIN, UNSPECIFIED TYPE: Primary | ICD-10-CM

## 2021-11-03 DIAGNOSIS — K58.1 IRRITABLE BOWEL SYNDROME WITH CONSTIPATION: ICD-10-CM

## 2021-11-03 PROBLEM — G24.9 DYSTONIA: Status: ACTIVE | Noted: 2020-07-21

## 2021-11-03 PROBLEM — R45.89 ANXIETY ABOUT HEALTH: Status: ACTIVE | Noted: 2020-08-17

## 2021-11-03 PROBLEM — F41.8 ANXIETY ABOUT HEALTH: Status: ACTIVE | Noted: 2020-08-17

## 2021-11-03 PROCEDURE — 99214 OFFICE O/P EST MOD 30 MIN: CPT | Performed by: INTERNAL MEDICINE

## 2021-11-03 RX ORDER — ABALOPARATIDE 2000 UG/ML
INJECTION, SOLUTION SUBCUTANEOUS
COMMUNITY
Start: 2021-08-18 | End: 2023-02-21

## 2021-11-03 RX ORDER — PEN NEEDLE, DIABETIC 31 GX3/16"
NEEDLE, DISPOSABLE MISCELLANEOUS
COMMUNITY
Start: 2021-08-18 | End: 2023-02-21

## 2021-11-03 RX ORDER — METRONIDAZOLE 7.5 MG/G
GEL TOPICAL
COMMUNITY
Start: 2021-10-07

## 2021-11-03 NOTE — PROGRESS NOTES
"Rooming Tab(CC,VS,Pt Hx,Fall Screen)  Chief Complaint   Patient presents with   • Chest Pain     ED FU       Subjective   Pt here for ER follow up-  Was  Seen for chest pain after having constipation-   has  Esophageal spasms every other month- will drink cold water and deep breath and resolve  increased parkinsons- sees neurologist-  Osteoporosis- having PT and balance is better- on tylmos now  Has anxiety- daughters worry about her more- [place] [address]-   increased the lexapro to 20mg but can't see a difference   was on propanolol and passed out- so quit  But took yesterday   cost of meds very expensive   linzess working but 150- and loose stools.    wants to travel and she does not    I have reviewed and updated her medications, medical history and problem list during today's office visit.     Patient Care Team:  Bobbi Rendon MD as PCP - General (Internal Medicine)  Zaire Ohara MD as Consulting Physician (Cardiology)    Problem List Tab  Medications Tab  Synopsis Tab  Chart Review Tab  Care Everywhere Tab  Immunizations Tab  Patient History Tab    Social History     Tobacco Use   • Smoking status: Never Smoker   • Smokeless tobacco: Never Used   Substance Use Topics   • Alcohol use: Yes     Comment: 3oz 3-4 days/week       Review of Systems    Objective     Rooming Tab(CC,VS,Pt Hx,Fall Screen)  /78 (BP Location: Left arm, Patient Position: Sitting, Cuff Size: Adult)   Pulse 63   Temp 97.1 °F (36.2 °C) (Temporal)   Resp 18   Ht 167.6 cm (66\")   Wt 58.5 kg (129 lb)   SpO2 98%   BMI 20.82 kg/m²     Body mass index is 20.82 kg/m².    Physical Exam  Vitals and nursing note reviewed.   Constitutional:       Appearance: Normal appearance. She is well-developed.   HENT:      Head: Normocephalic and atraumatic.      Right Ear: Tympanic membrane normal.      Left Ear: Tympanic membrane normal.      Nose: No rhinorrhea.      Mouth/Throat:      Pharynx: No posterior oropharyngeal " erythema.   Eyes:      Pupils: Pupils are equal, round, and reactive to light.   Cardiovascular:      Rate and Rhythm: Normal rate and regular rhythm.      Pulses: Normal pulses.      Heart sounds: Normal heart sounds. No murmur heard.      Pulmonary:      Effort: Pulmonary effort is normal.      Breath sounds: Normal breath sounds.   Abdominal:      General: Bowel sounds are normal. There is no distension.      Palpations: Abdomen is soft.   Musculoskeletal:         General: No tenderness.      Cervical back: Normal range of motion and neck supple.   Skin:     Capillary Refill: Capillary refill takes less than 2 seconds.   Neurological:      Mental Status: She is alert and oriented to person, place, and time.   Psychiatric:         Mood and Affect: Mood normal.         Behavior: Behavior normal.          Statin Choice Calculator  Data Reviewed:    CT Abdomen Pelvis With Contrast    Result Date: 10/30/2021  Impression: 1. Trace free fluid is present in the cul-de-sac, nonspecific. There is otherwise no evidence of acute disease in the abdomen or pelvis. 2. Chronic findings as above. Electronically signed by:  Yonis Sapp M.D.  10/30/2021 7:18 PM    XR Chest 1 View    Result Date: 10/30/2021  Impression: No acute cardiopulmonary abnormality is identified.  Electronically Signed By-Chelsea Mixon MD On:10/30/2021 7:24 PM This report was finalized on [id number] by  Chelsea Mixon MD.    XR Abdomen KUB    Result Date: 10/27/2021  Impression: Moderate colonic stool burden most notable in the descending colon. This corresponds to the patient's stated history of constipation. There is no evidence of high-grade bowel obstruction.  Electronically Signed By-Tawana Leger MD On:10/27/2021 11:04 AM This report was finalized on [id number] by  Tawana Leger MD.      The data below has been reviewed by Bobbi Rendon MD on 11/03/2021.      Lab Results   Component Value Date    BUN 12 10/30/2021    CREATININE  0.64 10/30/2021    EGFRIFNONA 92 10/30/2021     (L) 10/30/2021    K 3.6 10/30/2021    CL 93 (L) 10/30/2021    CALCIUM 8.8 10/30/2021    WBC 4.60 10/30/2021    RBC 4.02 10/30/2021    HCT 38.6 10/30/2021    MCV 96.1 10/30/2021    MCH 32.9 10/30/2021    INR 1.08 10/30/2021      Assessment/Plan   Order Review Tab  Health Maintenance Tab  Patient Plan/Order Tab  Diagnoses and all orders for this visit:    1. Chest pain, unspecified type (Primary)  Comments:  reviewed ER results- more from stress- start back with propapnolol and lexapro    2. Irritable bowel syndrome with constipation  Comments:  try linzess every other day to help with cost        Wrapup Tab  Return if symptoms worsen or fail to improve, for Medicare Wellness.       They were informed of the diagnosis and treatment plan and directed to f/u for any further problems or concerns.

## 2021-12-01 ENCOUNTER — LAB (OUTPATIENT)
Dept: LAB | Facility: HOSPITAL | Age: 71
End: 2021-12-01

## 2021-12-01 ENCOUNTER — TRANSCRIBE ORDERS (OUTPATIENT)
Dept: ADMINISTRATIVE | Facility: HOSPITAL | Age: 71
End: 2021-12-01

## 2021-12-01 DIAGNOSIS — M81.0 OSTEOPOROSIS OF LUMBAR SPINE: Primary | ICD-10-CM

## 2021-12-01 DIAGNOSIS — M81.0 OSTEOPOROSIS OF LUMBAR SPINE: ICD-10-CM

## 2021-12-01 LAB — CALCIUM SPEC-SCNC: 9.6 MG/DL (ref 8.6–10.5)

## 2021-12-01 PROCEDURE — 36415 COLL VENOUS BLD VENIPUNCTURE: CPT

## 2021-12-01 PROCEDURE — 82310 ASSAY OF CALCIUM: CPT

## 2021-12-01 RX ORDER — ATORVASTATIN CALCIUM 20 MG/1
TABLET, FILM COATED ORAL
Qty: 90 TABLET | Refills: 1 | OUTPATIENT
Start: 2021-12-01

## 2021-12-02 ENCOUNTER — TELEPHONE (OUTPATIENT)
Dept: FAMILY MEDICINE CLINIC | Facility: CLINIC | Age: 71
End: 2021-12-02

## 2021-12-02 ENCOUNTER — PATIENT MESSAGE (OUTPATIENT)
Dept: FAMILY MEDICINE CLINIC | Facility: CLINIC | Age: 71
End: 2021-12-02

## 2021-12-02 RX ORDER — ATORVASTATIN CALCIUM 10 MG/1
20 TABLET, FILM COATED ORAL DAILY
Qty: 90 TABLET | Refills: 3 | Status: SHIPPED | OUTPATIENT
Start: 2021-12-02 | End: 2021-12-06

## 2021-12-06 ENCOUNTER — TELEPHONE (OUTPATIENT)
Dept: FAMILY MEDICINE CLINIC | Facility: CLINIC | Age: 71
End: 2021-12-06

## 2021-12-06 RX ORDER — ATORVASTATIN CALCIUM 20 MG/1
20 TABLET, FILM COATED ORAL DAILY
Qty: 90 TABLET | Refills: 2 | Status: SHIPPED | OUTPATIENT
Start: 2021-12-06 | End: 2022-12-18

## 2021-12-06 NOTE — TELEPHONE ENCOUNTER
[facility]    What medication are you calling in regards to:atorvastatin (LIPITOR) 10 MG tablet    What question does the pharmacy have:PRIOR AUTH (QUANTITY STATEMENT LIMIT)    Who is the provider that prescribed the medication: DR STACY    Additional notes:   PHONE # [phone]       FAX # [phone]

## 2021-12-16 ENCOUNTER — OFFICE VISIT (OUTPATIENT)
Dept: CARDIOLOGY | Facility: CLINIC | Age: 71
End: 2021-12-16

## 2021-12-16 VITALS
OXYGEN SATURATION: 98 % | HEART RATE: 70 BPM | BODY MASS INDEX: 21.38 KG/M2 | HEIGHT: 66 IN | DIASTOLIC BLOOD PRESSURE: 65 MMHG | WEIGHT: 133 LBS | SYSTOLIC BLOOD PRESSURE: 97 MMHG

## 2021-12-16 DIAGNOSIS — I25.118 CORONARY ARTERY DISEASE OF NATIVE ARTERY OF NATIVE HEART WITH STABLE ANGINA PECTORIS (HCC): ICD-10-CM

## 2021-12-16 DIAGNOSIS — E78.00 PURE HYPERCHOLESTEROLEMIA: Primary | ICD-10-CM

## 2021-12-16 PROCEDURE — 99213 OFFICE O/P EST LOW 20 MIN: CPT | Performed by: INTERNAL MEDICINE

## 2021-12-16 NOTE — PROGRESS NOTES
"    Subjective:     Encounter Date:12/16/2021      Patient ID: rBeanne Nicole is a 71 y.o. female.    Chief Complaint:  History of Present Illness 71-year-old white female with history of coronary disease hyperlipidemia presents to my office for follow-up.  Patient is currently stable without is no chest pain or shortness of breath at rest on exertion but no complaint of PND orthopnea.  No palpitation dizziness syncope or swelling of the feet.  She is taking her meds regular patient does not smoke.    The following portions of the patient's history were reviewed and updated as appropriate: allergies, current medications, past family history, past medical history, past social history, past surgical history and problem list.  Past Medical History:   Diagnosis Date   • Anxiety Better    Quit taking meds   • Cancer (HCC)     Cancer free 5 years   • Coronary artery disease    • Heart murmur    • Hyperlipidemia    • Osteopenia     ?   • Parkinson's disease (HCC)    • Tremor     PD     Past Surgical History:   Procedure Laterality Date   • LYMPH NODE BIOPSY  2015     BP 97/65 (BP Location: Left arm, Patient Position: Sitting, Cuff Size: Adult)   Pulse 70   Ht 167.6 cm (66\")   Wt 60.3 kg (133 lb)   SpO2 98%   BMI 21.47 kg/m²   Family History   Problem Relation Age of Onset   • Heart disease Mother         CHF, heart attacks   • Heart disease Father         Heart desease   • Heart attack Father    • Heart disease Sister         Heart attack age58   • Heart attack Sister    • Heart attack Sister    • Heart disease Sister        Current Outpatient Medications:   •  atorvastatin (Lipitor) 20 MG tablet, Take 1 tablet by mouth Daily., Disp: 90 tablet, Rfl: 2  •  bisacodyl (Dulcolax) 10 MG suppository, Insert 1 suppository into the rectum Daily., Disp: 2 suppository, Rfl: 1  •  calcium carb-cholecalciferol 600-800 MG-UNIT tablet, Take 1 tablet by mouth., Disp: , Rfl:   •  carbidopa-levodopa (SINEMET)  MG per tablet, , " Disp: , Rfl:   •  Easy Touch Pen Needles 31G X 5 MM misc, USE AS DIRECTED WITH TYMLOS, Disp: , Rfl:   •  escitalopram (LEXAPRO) 10 MG tablet, Take 20 mg by mouth Daily., Disp: , Rfl:   •  linaclotide (Linzess) 145 MCG capsule capsule, Take 1 capsule by mouth Every Morning., Disp: 30 capsule, Rfl: 11  •  metroNIDAZOLE (METROGEL) 0.75 % gel, APPLY THIN LAYER TOPICALLY TO THE AFFECTED AREA TWICE DAILY IN THE MORNING AND IN THE EVENING FOR ROSACEA, Disp: , Rfl:   •  polyethylene glycol (MIRALAX) packet, Take 17 g by mouth., Disp: , Rfl:   •  propranolol (INDERAL) 20 MG tablet, 20 mg 3 (Three) Times a Day., Disp: , Rfl:   •  Tymlos 3120 MCG/1.56ML solution pen-injector, Inject 80 mcg UNDER THE SKIN ONCE daily, Disp: , Rfl:   Allergies   Allergen Reactions   • Codeine Nausea Only   • Penicillins Diarrhea and Nausea And Vomiting     Social History     Socioeconomic History   • Marital status:    Tobacco Use   • Smoking status: Never Smoker   • Smokeless tobacco: Never Used   Vaping Use   • Vaping Use: Never used   Substance and Sexual Activity   • Alcohol use: Yes     Comment: 3oz 3-4 days/week   • Drug use: Never   • Sexual activity: Defer     Review of Systems   Constitutional: Negative for fever and malaise/fatigue.   Cardiovascular: Negative for chest pain, dyspnea on exertion and palpitations.   Respiratory: Negative for cough and shortness of breath.    Skin: Negative for rash.   Gastrointestinal: Negative for abdominal pain, nausea and vomiting.   Neurological: Negative for focal weakness and headaches.   All other systems reviewed and are negative.             Objective:     Constitutional:       Appearance: Well-developed.   Eyes:      General: No scleral icterus.     Conjunctiva/sclera: Conjunctivae normal.   HENT:      Head: Normocephalic and atraumatic.   Neck:      Vascular: No carotid bruit or JVD.   Pulmonary:      Effort: Pulmonary effort is normal.      Breath sounds: Normal breath sounds. No  wheezing. No rales.   Cardiovascular:      Normal rate. Regular rhythm.   Pulses:     Intact distal pulses.   Abdominal:      General: Bowel sounds are normal.      Palpations: Abdomen is soft.   Musculoskeletal:      Cervical back: Normal range of motion and neck supple. Skin:     General: Skin is warm and dry.      Findings: No rash.   Neurological:      Mental Status: Alert.       Procedures    Lab Review:         MDM  1.  Coronary disease  Patient has nonobstructive disease now and is currently stable on medications with normal function  2.  Hyperlipidemia  Patient is currently on medical therapy and is followed by the primary doctor    Patient's previous medical records, labs, and EKG were reviewed and discussed with the patient at today's visit.

## 2021-12-29 ENCOUNTER — OFFICE (AMBULATORY)
Dept: URBAN - METROPOLITAN AREA CLINIC 64 | Facility: CLINIC | Age: 71
End: 2021-12-29

## 2021-12-29 VITALS
DIASTOLIC BLOOD PRESSURE: 76 MMHG | SYSTOLIC BLOOD PRESSURE: 109 MMHG | HEART RATE: 76 BPM | WEIGHT: 131 LBS | HEIGHT: 66 IN

## 2021-12-29 DIAGNOSIS — K59.00 CONSTIPATION, UNSPECIFIED: ICD-10-CM

## 2021-12-29 DIAGNOSIS — R14.0 ABDOMINAL DISTENSION (GASEOUS): ICD-10-CM

## 2021-12-29 PROCEDURE — 99204 OFFICE O/P NEW MOD 45 MIN: CPT | Performed by: INTERNAL MEDICINE

## 2021-12-29 RX ORDER — POLYETHYLENE GLYCOL 3350 17 G/17G
17 POWDER, FOR SOLUTION ORAL
Qty: 30 | Refills: 11 | Status: ACTIVE

## 2022-02-16 ENCOUNTER — OFFICE VISIT (OUTPATIENT)
Dept: FAMILY MEDICINE CLINIC | Facility: CLINIC | Age: 72
End: 2022-02-16

## 2022-02-16 VITALS
WEIGHT: 136.4 LBS | TEMPERATURE: 97.8 F | SYSTOLIC BLOOD PRESSURE: 108 MMHG | DIASTOLIC BLOOD PRESSURE: 72 MMHG | OXYGEN SATURATION: 96 % | HEART RATE: 67 BPM | HEIGHT: 66 IN | BODY MASS INDEX: 21.92 KG/M2 | RESPIRATION RATE: 16 BRPM

## 2022-02-16 DIAGNOSIS — G20 PARKINSON'S DISEASE: ICD-10-CM

## 2022-02-16 DIAGNOSIS — Z00.00 ENCOUNTER FOR GENERAL ADULT MEDICAL EXAMINATION WITHOUT ABNORMAL FINDINGS: Primary | ICD-10-CM

## 2022-02-16 DIAGNOSIS — K58.1 IRRITABLE BOWEL SYNDROME WITH CONSTIPATION: ICD-10-CM

## 2022-02-16 DIAGNOSIS — Z11.59 NEED FOR HEPATITIS C SCREENING TEST: ICD-10-CM

## 2022-02-16 DIAGNOSIS — E55.9 VITAMIN D DEFICIENCY: ICD-10-CM

## 2022-02-16 PROCEDURE — 1160F RVW MEDS BY RX/DR IN RCRD: CPT | Performed by: INTERNAL MEDICINE

## 2022-02-16 PROCEDURE — G0439 PPPS, SUBSEQ VISIT: HCPCS | Performed by: INTERNAL MEDICINE

## 2022-02-16 PROCEDURE — 1170F FXNL STATUS ASSESSED: CPT | Performed by: INTERNAL MEDICINE

## 2022-02-16 PROCEDURE — 99213 OFFICE O/P EST LOW 20 MIN: CPT | Performed by: INTERNAL MEDICINE

## 2022-02-16 NOTE — PROGRESS NOTES
The ABCs of the Annual Wellness Visit  Subsequent Medicare Wellness Visit    Chief Complaint   Patient presents with   • Medicare Wellness-subsequent      Subjective    History of Present Illness:  Breanne Nicole is a 71 y.o. female who presents for a Subsequent Medicare Wellness Visit and other concerns.  Constipation finally getting better- metamucil and sinemet helping  Finally.    No chest pain- has some GERD at times  Still with counseling- has panic attacks on lexapro  Psychiatrist is thinking of len  Sees knable yearly     The following portions of the patient's history were reviewed and   updated as appropriate: current medications, past family history, past medical history, past social history, past surgical history and problem list.    Compared to one year ago, the patient feels her physical   health is the same.    Compared to one year ago, the patient feels her mental   health is worse.    Recent Hospitalizations:  She was not admitted to the hospital during the last year.       Current Medical Providers:  Patient Care Team:  Bobbi Rendon MD as PCP - General (Internal Medicine)  Zaire Ohara MD as Consulting Physician (Cardiology)    Outpatient Medications Prior to Visit   Medication Sig Dispense Refill   • atorvastatin (Lipitor) 20 MG tablet Take 1 tablet by mouth Daily. 90 tablet 2   • calcium carb-cholecalciferol 600-800 MG-UNIT tablet Take 1 tablet by mouth.     • carbidopa-levodopa (SINEMET)  MG per tablet      • Easy Touch Pen Needles 31G X 5 MM misc USE AS DIRECTED WITH TYMLOS     • escitalopram (LEXAPRO) 10 MG tablet Take 20 mg by mouth Daily.     • metroNIDAZOLE (METROGEL) 0.75 % gel APPLY THIN LAYER TOPICALLY TO THE AFFECTED AREA TWICE DAILY IN THE MORNING AND IN THE EVENING FOR ROSACEA     • polyethylene glycol (MIRALAX) packet Take 17 g by mouth.     • Tymlos 3120 MCG/1.56ML solution pen-injector Inject 80 mcg UNDER THE SKIN ONCE daily     • Wheat Dextrin (BENEFIBER ON  "THE GO PO)      • bisacodyl (Dulcolax) 10 MG suppository Insert 1 suppository into the rectum Daily. 2 suppository 1   • linaclotide (Linzess) 145 MCG capsule capsule Take 1 capsule by mouth Every Morning. 30 capsule 11   • propranolol (INDERAL) 20 MG tablet 20 mg 3 (Three) Times a Day.       No facility-administered medications prior to visit.       No opioid medication identified on active medication list. I have reviewed chart for other potential  high risk medication/s and harmful drug interactions in the elderly.          Aspirin is not on active medication list.  Aspirin use is not indicated based on review of current medical condition/s. Risk of harm outweighs potential benefits.  .    Patient Active Problem List   Diagnosis   • Coronary artery disease involving native coronary artery without angina pectoris   • Chest pain   • Chest pressure   • Heart murmur   • Hyperlipidemia   • Fatigue   • Family history of coronary artery disease   • Shortness of breath   • Palpitations   • Allergic state   • Anemia   • B-cell lymphoma (HCC)   • Constipation   • Encounter for general adult medical examination without abnormal findings   • Generalized anxiety disorder   • Irritable bowel syndrome   • Osteoarthritis   • Osteoporosis   • Parkinson's disease (HCC)   • Rosacea   • Anxiety about health   • Dystonia   • Tremor   • Vitamin D deficiency     Advance Care Planning  Advance Directive is not on file.  ACP discussion was declined by the patient. Patient does not have an advance directive, information provided.          Objective    Vitals:    02/16/22 1454   BP: 108/72   BP Location: Right arm   Patient Position: Sitting   Cuff Size: Adult   Pulse: 67   Resp: 16   Temp: 97.8 °F (36.6 °C)   TempSrc: Temporal   SpO2: 96%   Weight: 61.9 kg (136 lb 6.4 oz)   Height: 167.6 cm (65.98\")     BMI Readings from Last 1 Encounters:   02/16/22 22.03 kg/m²   BMI is within normal parameters. No follow-up required.    Does the patient " have evidence of cognitive impairment? No    Physical Exam  Vitals and nursing note reviewed.   Constitutional:       Appearance: Normal appearance. She is well-developed.   HENT:      Head: Normocephalic and atraumatic.      Right Ear: Tympanic membrane normal.      Left Ear: Tympanic membrane normal.      Nose: No rhinorrhea.      Mouth/Throat:      Pharynx: No posterior oropharyngeal erythema.   Eyes:      Pupils: Pupils are equal, round, and reactive to light.   Cardiovascular:      Rate and Rhythm: Normal rate and regular rhythm.      Pulses: Normal pulses.      Heart sounds: Normal heart sounds. No murmur heard.      Pulmonary:      Effort: Pulmonary effort is normal.      Breath sounds: Normal breath sounds.   Chest:   Breasts:      Right: No inverted nipple or mass.      Left: No inverted nipple or mass.       Abdominal:      General: Bowel sounds are normal. There is no distension.      Palpations: Abdomen is soft.   Musculoskeletal:         General: No tenderness.      Cervical back: Normal range of motion and neck supple.      Comments: Increased thoracic scolosis with lordosis   Skin:     Capillary Refill: Capillary refill takes less than 2 seconds.   Neurological:      Mental Status: She is alert and oriented to person, place, and time.   Psychiatric:         Mood and Affect: Mood normal.         Behavior: Behavior normal.       Lab Results   Component Value Date    TRIG 49 02/18/2022    HDL 81 (H) 02/18/2022    LDL 73 02/18/2022    VLDL 10 02/18/2022            HEALTH RISK ASSESSMENT    Smoking Status:  Social History     Tobacco Use   Smoking Status Never Smoker   Smokeless Tobacco Never Used     Alcohol Consumption:  Social History     Substance and Sexual Activity   Alcohol Use Yes   • Alcohol/week: 1.0 standard drink   • Types: 1 Cans of beer per week    Comment: 3oz 3-4 days/week     Fall Risk Screen:    STEADI Fall Risk Assessment was completed, and patient is at LOW risk for falls.Assessment  completed on:2/16/2022    Depression Screening:  PHQ-2/PHQ-9 Depression Screening 2/16/2022   Little interest or pleasure in doing things 0   Feeling down, depressed, or hopeless 0   Total Score 0       Health Habits and Functional and Cognitive Screening:  Functional & Cognitive Status 2/16/2022   Do you have difficulty preparing food and eating? No   Do you have difficulty bathing yourself, getting dressed or grooming yourself? No   Do you have difficulty using the toilet? No   Do you have difficulty moving around from place to place? No   Do you have trouble with steps or getting out of a bed or a chair? No   Current Diet Well Balanced Diet   Dental Exam Up to date   Eye Exam Not up to date   Exercise (times per week) 4 times per week   Current Exercises Include Walking;Aerobics;Home Exercise Program (TV, Computer, Etc.)   Current Exercise Activities Include -   Do you need help using the phone?  No   Are you deaf or do you have serious difficulty hearing?  No   Do you need help with transportation? No   Do you need help shopping? No   Do you need help preparing meals?  No   Do you need help with housework?  No   Do you need help with laundry? No   Do you need help taking your medications? No   Do you need help managing money? No   Do you ever drive or ride in a car without wearing a seat belt? No   Have you felt unusual stress, anger or loneliness in the last month? No   Who do you live with? Spouse   If you need help, do you have trouble finding someone available to you? No   Have you been bothered in the last four weeks by sexual problems? No   Do you have difficulty concentrating, remembering or making decisions? No       Age-appropriate Screening Schedule:  Refer to the list below for future screening recommendations based on patient's age, sex and/or medical conditions. Orders for these recommended tests are listed in the plan section. The patient has been provided with a written plan.    Health Maintenance    Topic Date Due   • TDAP/TD VACCINES (1 - Tdap) Never done   • ZOSTER VACCINE (2 of 2) 12/06/2018   • LIPID PANEL  02/18/2023   • MAMMOGRAM  05/27/2023   • DXA SCAN  05/27/2023   • INFLUENZA VACCINE  Completed              Assessment/Plan   CMS Preventative Services Quick Reference  Risk Factors Identified During Encounter  Depression/Dysphoria  Fall Risk-High or Moderate  The above risks/problems have been discussed with the patient.  Follow up actions/plans if indicated are seen below in the Assessment/Plan Section.  Pertinent information has been shared with the patient in the After Visit Summary.    Diagnoses and all orders for this visit:    1. Encounter for general adult medical examination without abnormal findings (Primary)  Comments:  discussed all recommendations  Orders:  -     Comprehensive Metabolic Panel; Future  -     CBC Auto Differential; Future  -     Lipid Panel; Future  -     Vitamin D 25 Hydroxy; Future  -     TSH; Future    2. Need for hepatitis C screening test  -     Hepatitis C Antibody; Future    3. Irritable bowel syndrome with constipation    4. Vitamin D deficiency  -     Vitamin D 25 Hydroxy; Future    5. Parkinson's disease (HCC)    scoloiosis    Follow Up:   Return in about 1 year (around 2/16/2023), or if symptoms worsen or fail to improve, for Medicare Wellness.     An After Visit Summary and PPPS were made available to the patient.    During this visit for their annual exam, we reviewed their personal history, social history and family history.  We went over their medications and all the recommended health maintenence items for their age group. They were given the opportunity to ask questions and discuss other concerns.

## 2022-02-18 ENCOUNTER — LAB (OUTPATIENT)
Dept: FAMILY MEDICINE CLINIC | Facility: CLINIC | Age: 72
End: 2022-02-18

## 2022-02-18 DIAGNOSIS — E55.9 VITAMIN D DEFICIENCY: ICD-10-CM

## 2022-02-18 DIAGNOSIS — Z11.59 NEED FOR HEPATITIS C SCREENING TEST: ICD-10-CM

## 2022-02-18 DIAGNOSIS — Z00.00 ENCOUNTER FOR GENERAL ADULT MEDICAL EXAMINATION WITHOUT ABNORMAL FINDINGS: ICD-10-CM

## 2022-02-18 LAB
25(OH)D3 SERPL-MCNC: 27.5 NG/ML (ref 30–100)
ALBUMIN SERPL-MCNC: 4.4 G/DL (ref 3.5–5.2)
ALBUMIN/GLOB SERPL: 1.8 G/DL
ALP SERPL-CCNC: 99 U/L (ref 39–117)
ALT SERPL W P-5'-P-CCNC: <5 U/L (ref 1–33)
ANION GAP SERPL CALCULATED.3IONS-SCNC: 10 MMOL/L (ref 5–15)
AST SERPL-CCNC: 21 U/L (ref 1–32)
BASOPHILS # BLD AUTO: 0.03 10*3/MM3 (ref 0–0.2)
BASOPHILS NFR BLD AUTO: 0.6 % (ref 0–1.5)
BILIRUB SERPL-MCNC: 0.6 MG/DL (ref 0–1.2)
BUN SERPL-MCNC: 13 MG/DL (ref 8–23)
BUN/CREAT SERPL: 16.9 (ref 7–25)
CALCIUM SPEC-SCNC: 9.5 MG/DL (ref 8.6–10.5)
CHLORIDE SERPL-SCNC: 102 MMOL/L (ref 98–107)
CHOLEST SERPL-MCNC: 164 MG/DL (ref 0–200)
CO2 SERPL-SCNC: 29 MMOL/L (ref 22–29)
CREAT SERPL-MCNC: 0.77 MG/DL (ref 0.57–1)
DEPRECATED RDW RBC AUTO: 41.4 FL (ref 37–54)
EOSINOPHIL # BLD AUTO: 0.28 10*3/MM3 (ref 0–0.4)
EOSINOPHIL NFR BLD AUTO: 5.6 % (ref 0.3–6.2)
ERYTHROCYTE [DISTWIDTH] IN BLOOD BY AUTOMATED COUNT: 11.9 % (ref 12.3–15.4)
GFR SERPL CREATININE-BSD FRML MDRD: 74 ML/MIN/1.73
GLOBULIN UR ELPH-MCNC: 2.4 GM/DL
GLUCOSE SERPL-MCNC: 88 MG/DL (ref 65–99)
HCT VFR BLD AUTO: 41.6 % (ref 34–46.6)
HCV AB SER DONR QL: NORMAL
HDLC SERPL-MCNC: 81 MG/DL (ref 40–60)
HGB BLD-MCNC: 14.4 G/DL (ref 12–15.9)
IMM GRANULOCYTES # BLD AUTO: 0.01 10*3/MM3 (ref 0–0.05)
IMM GRANULOCYTES NFR BLD AUTO: 0.2 % (ref 0–0.5)
LDLC SERPL CALC-MCNC: 73 MG/DL (ref 0–100)
LDLC/HDLC SERPL: 0.9 {RATIO}
LYMPHOCYTES # BLD AUTO: 1.43 10*3/MM3 (ref 0.7–3.1)
LYMPHOCYTES NFR BLD AUTO: 28.8 % (ref 19.6–45.3)
MCH RBC QN AUTO: 33 PG (ref 26.6–33)
MCHC RBC AUTO-ENTMCNC: 34.6 G/DL (ref 31.5–35.7)
MCV RBC AUTO: 95.4 FL (ref 79–97)
MONOCYTES # BLD AUTO: 0.48 10*3/MM3 (ref 0.1–0.9)
MONOCYTES NFR BLD AUTO: 9.7 % (ref 5–12)
NEUTROPHILS NFR BLD AUTO: 2.74 10*3/MM3 (ref 1.7–7)
NEUTROPHILS NFR BLD AUTO: 55.1 % (ref 42.7–76)
NRBC BLD AUTO-RTO: 0 /100 WBC (ref 0–0.2)
PLATELET # BLD AUTO: 230 10*3/MM3 (ref 140–450)
PMV BLD AUTO: 10.1 FL (ref 6–12)
POTASSIUM SERPL-SCNC: 4.4 MMOL/L (ref 3.5–5.2)
PROT SERPL-MCNC: 6.8 G/DL (ref 6–8.5)
RBC # BLD AUTO: 4.36 10*6/MM3 (ref 3.77–5.28)
SODIUM SERPL-SCNC: 141 MMOL/L (ref 136–145)
TRIGL SERPL-MCNC: 49 MG/DL (ref 0–150)
TSH SERPL DL<=0.05 MIU/L-ACNC: 1.52 UIU/ML (ref 0.27–4.2)
VLDLC SERPL-MCNC: 10 MG/DL (ref 5–40)
WBC NRBC COR # BLD: 4.97 10*3/MM3 (ref 3.4–10.8)

## 2022-02-18 PROCEDURE — 85025 COMPLETE CBC W/AUTO DIFF WBC: CPT | Performed by: INTERNAL MEDICINE

## 2022-02-18 PROCEDURE — 80061 LIPID PANEL: CPT | Performed by: INTERNAL MEDICINE

## 2022-02-18 PROCEDURE — 80053 COMPREHEN METABOLIC PANEL: CPT | Performed by: INTERNAL MEDICINE

## 2022-02-18 PROCEDURE — 82306 VITAMIN D 25 HYDROXY: CPT | Performed by: INTERNAL MEDICINE

## 2022-02-18 PROCEDURE — 84443 ASSAY THYROID STIM HORMONE: CPT | Performed by: INTERNAL MEDICINE

## 2022-02-18 PROCEDURE — 86803 HEPATITIS C AB TEST: CPT | Performed by: INTERNAL MEDICINE

## 2022-02-18 PROCEDURE — 36415 COLL VENOUS BLD VENIPUNCTURE: CPT

## 2022-02-21 NOTE — PROGRESS NOTES
Left detailed voicemail for Breanne Nicole as per verbal release. Advised Breanne Nicole to call the office with any additional questions.

## 2022-04-05 ENCOUNTER — OFFICE (AMBULATORY)
Dept: URBAN - METROPOLITAN AREA CLINIC 64 | Facility: CLINIC | Age: 72
End: 2022-04-05
Payer: COMMERCIAL

## 2022-04-05 VITALS
SYSTOLIC BLOOD PRESSURE: 117 MMHG | HEART RATE: 62 BPM | HEIGHT: 66 IN | DIASTOLIC BLOOD PRESSURE: 73 MMHG | WEIGHT: 138 LBS

## 2022-04-05 DIAGNOSIS — K59.00 CONSTIPATION, UNSPECIFIED: ICD-10-CM

## 2022-04-05 PROCEDURE — 99214 OFFICE O/P EST MOD 30 MIN: CPT | Performed by: INTERNAL MEDICINE

## 2022-08-04 ENCOUNTER — OFFICE VISIT (OUTPATIENT)
Dept: CARDIOLOGY | Facility: CLINIC | Age: 72
End: 2022-08-04

## 2022-08-04 VITALS
HEIGHT: 66 IN | DIASTOLIC BLOOD PRESSURE: 79 MMHG | SYSTOLIC BLOOD PRESSURE: 118 MMHG | BODY MASS INDEX: 21.69 KG/M2 | HEART RATE: 67 BPM | OXYGEN SATURATION: 98 % | WEIGHT: 135 LBS

## 2022-08-04 DIAGNOSIS — E78.00 PURE HYPERCHOLESTEROLEMIA: Primary | ICD-10-CM

## 2022-08-04 DIAGNOSIS — I25.118 CORONARY ARTERY DISEASE OF NATIVE ARTERY OF NATIVE HEART WITH STABLE ANGINA PECTORIS: ICD-10-CM

## 2022-08-04 PROCEDURE — 99213 OFFICE O/P EST LOW 20 MIN: CPT | Performed by: INTERNAL MEDICINE

## 2022-08-04 NOTE — PROGRESS NOTES
"    Subjective:     Encounter Date:08/04/2022      Patient ID: Breanne Nicole is a 72 y.o. female.    Chief Complaint:  History of Present Illness 72-year-old white female with history of coronary disease and hyperlipidemia presents to the office for follow-up.  Patient is currently stable without any signs of chest pain but has some shortness of breath with exertion.  No complains any PND orthopnea.  No palpitation but has occasional dizziness when she stands up suddenly.  No syncope or swelling of the feet.  She is taking her medicines regular.  She has Parkinson's disease and is also followed by neurologist and taking medications.    The following portions of the patient's history were reviewed and updated as appropriate: allergies, current medications, past family history, past medical history, past social history, past surgical history and problem list.  Past Medical History:   Diagnosis Date   • Aneurysm (HCC) 2015 CT scan during CA treat   • Anxiety Better    Quit taking meds   • Cancer (HCC)     Cancer free 5 years   • Coronary artery disease    • Heart murmur    • Hyperlipidemia    • Irritable bowel syndrome chronic constipation   • Low back pain right, lower scapula   • Osteopenia     ?   • Parkinson's disease (HCC)    • Scoliosis has progressed   • Tremor     PD     Past Surgical History:   Procedure Laterality Date   • COLONOSCOPY  2017   • LYMPH NODE BIOPSY  2015     /79 (BP Location: Left arm, Patient Position: Sitting, Cuff Size: Adult)   Pulse 67   Ht 167.6 cm (65.98\")   Wt 61.2 kg (135 lb)   SpO2 98%   BMI 21.80 kg/m²   Family History   Problem Relation Age of Onset   • Heart disease Mother         CHF, heart attacks   • Hyperlipidemia Mother    • Heart disease Father         Heart desease   • Heart attack Father    • Heart disease Sister         Heart attack age60   • Heart attack Sister    • Hearing loss Sister    • Heart attack Sister    • Heart disease Sister         age 51   • " Hearing loss Sister    • Heart attack Sister    • Anxiety disorder Son    • Depression Son    • Diabetes Brother    • Hearing loss Brother    • Hyperlipidemia Brother    • Hypertension Brother    • Hearing loss Brother    • Hyperlipidemia Brother    • Mental illness Paternal Aunt        Current Outpatient Medications:   •  atorvastatin (Lipitor) 20 MG tablet, Take 1 tablet by mouth Daily., Disp: 90 tablet, Rfl: 2  •  calcium carb-cholecalciferol 600-800 MG-UNIT tablet, Take 1 tablet by mouth., Disp: , Rfl:   •  carbidopa-levodopa (SINEMET)  MG per tablet, , Disp: , Rfl:   •  Cyanocobalamin (VITAMIN B-12 PO), Take  by mouth., Disp: , Rfl:   •  Easy Touch Pen Needles 31G X 5 MM misc, USE AS DIRECTED WITH TYMLOS, Disp: , Rfl:   •  escitalopram (LEXAPRO) 10 MG tablet, Take 30 mg by mouth Daily., Disp: , Rfl:   •  MAGNESIUM ASPARTATE PO, Take  by mouth., Disp: , Rfl:   •  metroNIDAZOLE (METROGEL) 0.75 % gel, APPLY THIN LAYER TOPICALLY TO THE AFFECTED AREA TWICE DAILY IN THE MORNING AND IN THE EVENING FOR ROSACEA, Disp: , Rfl:   •  polyethylene glycol (MIRALAX) packet, Take 17 g by mouth., Disp: , Rfl:   •  Tymlos 3120 MCG/1.56ML solution pen-injector, Inject 80 mcg UNDER THE SKIN ONCE daily, Disp: , Rfl:   •  VITAMIN D PO, Take  by mouth., Disp: , Rfl:   •  Wheat Dextrin (BENEFIBER ON THE GO PO), , Disp: , Rfl:   Allergies   Allergen Reactions   • Codeine Nausea Only   • Penicillins Diarrhea and Nausea And Vomiting     Social History     Socioeconomic History   • Marital status:    Tobacco Use   • Smoking status: Never Smoker   • Smokeless tobacco: Never Used   Vaping Use   • Vaping Use: Never used   Substance and Sexual Activity   • Alcohol use: Yes     Alcohol/week: 1.0 standard drink     Types: 1 Cans of beer per week     Comment: 3oz 1-2 days/week   • Drug use: Never   • Sexual activity: Yes     Partners: Male     Birth control/protection: Post-menopausal     Review of Systems   Constitutional: Negative  for fever and malaise/fatigue.   Cardiovascular: Negative for chest pain, dyspnea on exertion and palpitations.   Respiratory: Positive for shortness of breath. Negative for cough.    Skin: Negative for rash.   Gastrointestinal: Negative for abdominal pain, nausea and vomiting.   Neurological: Positive for light-headedness and numbness. Negative for focal weakness and headaches.   All other systems reviewed and are negative.             Objective:     Constitutional:       Appearance: Well-developed.   Eyes:      General: No scleral icterus.     Conjunctiva/sclera: Conjunctivae normal.   HENT:      Head: Normocephalic and atraumatic.   Neck:      Vascular: No carotid bruit or JVD.   Pulmonary:      Effort: Pulmonary effort is normal.      Breath sounds: Normal breath sounds. No wheezing. No rales.   Cardiovascular:      Normal rate. Regular rhythm.   Pulses:     Intact distal pulses.   Abdominal:      General: Bowel sounds are normal.      Palpations: Abdomen is soft.   Musculoskeletal:      Cervical back: Normal range of motion and neck supple. Skin:     General: Skin is warm and dry.      Findings: No rash.   Neurological:      Mental Status: Alert.       Procedures    Lab Review:         MDM  1.  Coronary disease  Patient has nonobstructive disease and is currently stable on medications with normal V function  2.  Hyperlipidemia  Patient is on atorvastatin and the lipid levels are well within normal limits.      Patient's previous medical records, labs, and EKG were reviewed and discussed with the patient at today's visit.

## 2022-08-29 ENCOUNTER — TELEPHONE (OUTPATIENT)
Dept: FAMILY MEDICINE CLINIC | Facility: CLINIC | Age: 72
End: 2022-08-29

## 2022-08-29 NOTE — TELEPHONE ENCOUNTER
Caller: Breanne Nicole    Relationship to patient: Self    Best call back number: [phone]    Date of exposure: EXPOSED THROUGH      Date of positive COVID19 test: 08/29/22, HOME TEST     Date if possible COVID19 exposure: N/A    COVID19 symptoms: FEVER, SORE THROAT, HEADACHE, CONGESTION, RUNNY NOSE    Date of initial quarantine: 08/29/22    Additional information or concerns: PATIENT IS WANTING TO SEE IF SHE QUALIFIES FOR PAXLOVID, SHE SAID HER  IS COVID POSITIVE AND CURRENTLY TAKING IT    SHE IS ALSO WANTING TO SEE IF IT IS SAFE FOR HER TO TAKE COUGH PEARLS AT NIGHT, AND IF SO, WOULD LIKE THEM SENT IN AS WELL    SHE SAID THAT SHE TOOK AN ANTIHISTAMINE YESTERDAY AND FELT LIKE HER HEAD WAS IN THE CLOUDS FOR EIGHT HOURS, SHE IS NOT SURE IF THAT WAS DUE TO THE ANTIHISTAMINE OR NOT     What is the patients preferred pharmacy: [facility] [place] [address] - [phone] [facility] [phone]   [phone]

## 2022-09-09 ENCOUNTER — TRANSCRIBE ORDERS (OUTPATIENT)
Dept: ADMINISTRATIVE | Facility: HOSPITAL | Age: 72
End: 2022-09-09

## 2022-09-09 ENCOUNTER — LAB (OUTPATIENT)
Dept: LAB | Facility: HOSPITAL | Age: 72
End: 2022-09-09

## 2022-09-09 DIAGNOSIS — M81.0 SENILE OSTEOPOROSIS: Primary | ICD-10-CM

## 2022-09-09 DIAGNOSIS — M81.0 SENILE OSTEOPOROSIS: ICD-10-CM

## 2022-09-09 LAB
25(OH)D3 SERPL-MCNC: 56.5 NG/ML (ref 30–100)
ANION GAP SERPL CALCULATED.3IONS-SCNC: 5.4 MMOL/L (ref 5–15)
BUN SERPL-MCNC: 15 MG/DL (ref 8–23)
BUN/CREAT SERPL: 22.7 (ref 7–25)
CALCIUM SPEC-SCNC: 9.7 MG/DL (ref 8.6–10.5)
CHLORIDE SERPL-SCNC: 100 MMOL/L (ref 98–107)
CO2 SERPL-SCNC: 30.6 MMOL/L (ref 22–29)
CREAT SERPL-MCNC: 0.66 MG/DL (ref 0.57–1)
EGFRCR SERPLBLD CKD-EPI 2021: 93.9 ML/MIN/1.73
GLUCOSE SERPL-MCNC: 90 MG/DL (ref 65–99)
POTASSIUM SERPL-SCNC: 4.3 MMOL/L (ref 3.5–5.2)
SODIUM SERPL-SCNC: 136 MMOL/L (ref 136–145)

## 2022-09-09 PROCEDURE — 36415 COLL VENOUS BLD VENIPUNCTURE: CPT

## 2022-09-09 PROCEDURE — 80048 BASIC METABOLIC PNL TOTAL CA: CPT

## 2022-09-09 PROCEDURE — 82306 VITAMIN D 25 HYDROXY: CPT

## 2022-10-26 ENCOUNTER — TELEPHONE (OUTPATIENT)
Dept: FAMILY MEDICINE CLINIC | Facility: CLINIC | Age: 72
End: 2022-10-26

## 2022-10-26 DIAGNOSIS — Z12.31 ENCOUNTER FOR SCREENING MAMMOGRAM FOR MALIGNANT NEOPLASM OF BREAST: Primary | ICD-10-CM

## 2022-10-26 NOTE — TELEPHONE ENCOUNTER
Caller: Breanne Nicole    Relationship: Self    Best call back number: [phone]    What orders are you requesting (i.e. lab or imaging): MAMMOGRAM     Where will you receive your lab/imaging services: [facility]

## 2022-11-03 ENCOUNTER — TRANSCRIBE ORDERS (OUTPATIENT)
Dept: ADMINISTRATIVE | Facility: HOSPITAL | Age: 72
End: 2022-11-03

## 2022-11-03 DIAGNOSIS — R13.10 DYSPHAGIA, UNSPECIFIED TYPE: Primary | ICD-10-CM

## 2022-11-09 DIAGNOSIS — Z12.31 ENCOUNTER FOR SCREENING MAMMOGRAM FOR MALIGNANT NEOPLASM OF BREAST: ICD-10-CM

## 2022-11-18 ENCOUNTER — HOSPITAL ENCOUNTER (OUTPATIENT)
Dept: GENERAL RADIOLOGY | Facility: HOSPITAL | Age: 72
Discharge: HOME | End: 2022-11-18
Admitting: PSYCHIATRY & NEUROLOGY

## 2022-11-18 DIAGNOSIS — R13.10 DYSPHAGIA, UNSPECIFIED TYPE: ICD-10-CM

## 2022-11-18 PROCEDURE — A9270 NON-COVERED ITEM OR SERVICE: HCPCS | Performed by: PSYCHIATRY & NEUROLOGY

## 2022-11-18 PROCEDURE — 74230 X-RAY XM SWLNG FUNCJ C+: CPT

## 2022-11-18 PROCEDURE — 92611 MOTION FLUOROSCOPY/SWALLOW: CPT

## 2022-11-18 PROCEDURE — 63710000001 BARIUM SULFATE 40 % SUSPENSION: Performed by: PSYCHIATRY & NEUROLOGY

## 2022-11-18 RX ADMIN — BARIUM SULFATE 50 ML: 400 SUSPENSION ORAL at 10:33

## 2022-11-18 NOTE — MBS/VFSS/FEES
Outpatient Speech Language Pathology   Adult Swallow Initial Evaluation   Sanjiv     Patient Name: Breanne Nicole  : 1950  MRN: [id number]  Today's Date: 2022         Visit Date: 2022   Patient Active Problem List   Diagnosis   • Coronary artery disease involving native coronary artery without angina pectoris   • Chest pain   • Chest pressure   • Heart murmur   • Hyperlipidemia   • Fatigue   • Family history of coronary artery disease   • Shortness of breath   • Palpitations   • Allergic state   • Anemia   • B-cell lymphoma (HCC)   • Constipation   • Encounter for general adult medical examination without abnormal findings   • Generalized anxiety disorder   • Irritable bowel syndrome   • Osteoarthritis   • Osteoporosis   • Parkinson's disease (HCC)   • Rosacea   • Anxiety about health   • Dystonia   • Tremor   • Vitamin D deficiency        Past Medical History:   Diagnosis Date   • Aneurysm (HCC)  CT scan during CA treat   • Anxiety Better    Quit taking meds   • Cancer (HCC)     Cancer free 5 years   • Coronary artery disease    • Heart murmur    • Hyperlipidemia    • Irritable bowel syndrome chronic constipation   • Low back pain right, lower scapula   • Osteopenia     ?   • Parkinson's disease (HCC)    • Scoliosis has progressed   • Tremor     PD        Past Surgical History:   Procedure Laterality Date   • COLONOSCOPY  2017   • LYMPH NODE BIOPSY  2015         Visit Dx:     ICD-10-CM ICD-9-CM   1. Dysphagia, unspecified type  R13.10 787.20                SLP Adult Swallow Evaluation     Row Name 22 1700       Rehab Evaluation    Document Type evaluation  -CP    Subjective Information complains of  difficulty swallowing  -CP    Patient Observations alert;cooperative  -CP    Patient Effort good  -CP       General Information    Patient Profile Reviewed yes  -CP    Pertinent History Of Current Problem Pt presents today for VFSS due to complaints of difficulty swallowing. She reports  coughing on liquids at times and feels this coughing is increasing, a globus sensation in mid-throat area that is difficult to clear, and bread/dry-sticky items also sticking and difficult to go down. Pt has a recent dx of reflux and has been placed on pepcid. She reports that her bloating and reflux has improved. Pt has a PMH of parkinson's disease and is currently recieving speech therapy at [facility] as an outpatient. pt was referred for VFSS by [facility] SLP. Pt presents today for furtehr evaluation of swallow, risk for aspiration and least restrictive diet.  -CP    Current Method of Nutrition regular textures;thin liquids  -CP    Prior Level of Function-Swallowing regular textures;thin liquids;compensations (maneuvers, postures) needed;esophageal concerns  -CP    Plans/Goals Discussed with patient  -CP    Barriers to Rehab none identified  -CP       Pain    Additional Documentation Pain Scale: FACES Pre/Post-Treatment (Group)  -CP       Pain Scale: FACES Pre/Post-Treatment    Pain: FACES Scale, Pretreatment 0-->no hurt  -CP    Posttreatment Pain Rating 0-->no hurt  -CP       Oral Motor Structure and Function    Dentition Assessment natural, present and adequate  -CP    Secretion Management WNL/WFL  -CP       Oral Musculature and Cranial Nerve Assessment    Oral Motor General Assessment WFL  -CP       General Eating/Swallowing Observations    Respiratory Support Currently in Use room air  -CP    Eating/Swallowing Skills self-fed;appropriate self-feeding skills observed  -CP    Positioning During Eating other (see comments)  Standing/lateral position  -CP       MBS/VFSS    Utensils Used spoon;cup  -CP    Consistencies Trialed thin liquids;pureed;soft to chew textures;regular textures  -CP       MBS/VFSS Interpretation    VFSS Summary VFSS completed with standing in the lateral position and moved to A-P position at the end of the study. Pt fed herself trials of thin barium by cup x2, barium coated applesauce x1, thin barium  by cup x1 as a liquid wash followed by barium coated applesauce x1, barium coated peaches x2, a cracker coated with barium paste x1, thin barium by cup in rapid consecutive sips x1, an esophageal scan and thin barium by cup x3 in the A-P position to complete the study. Pt took appropriate size bites and sips. Mastication of the peaches and cracker was rotary and efficient. Oral transit and bolus control functional for all consistencies. Swallow timely. Adequate laryngeal elevation and anterior hyoid excursion appreciated.  Epiglottic inversion and laryngeal vestibular closure complete. Pt exhibited no penetration or aspiration of any consistency at any time. There was mod residue remaining in the valleculae after the swallow. A spontaneous double swallow cleared some and a liquid wash with a double swallow cleared the remaining residue. This vallecular residue appeared to be pooling in an outpouching of the valleculae, and the A-P view of the swallow confirms that the outpouching is on the R side. This is where pt feels the globus sensation while eating. See picture below. Multiple osteophytes with protrusion into the esophagus visulaized with mild retention of barium between each protrusion. A full esophageal scan revealed retention and slow clearing in lower esophagus. See below.        Pt presents with functional oral swallow and mild pharyngeal dysphagia characterized by consistent residue in an outpouching of the R valleculae. Esophageal dysphagia should also be considered based on results of esophageal scan. It is recommended that pt continue current regular diet with thin liquids as tolerated. Pt should be upright at 90 degrees for all PO. Pt should take small bites and sips and eat at a slow rate. Pt should take a sip of liquid after every 2-3 bites to and use a double swallow to help clear any pharyngeal/esophageal residue. Swallow strengthening exercises may benefit this pt to aid in clearing the  vallecular residue. Extensive education was given to pt regarding the full results and recommendations from this study and pt was shown images from the [facility].  Pt expressed understanding. Thank you for referring this pt. If you have any questions regarding this study, feel free to contact this dept at [phone].        Outpouching near R valleculae    Esophageal scan  -CP       Swallowing Quality of Life Assessment    Education and counseling provided Signs of aspiration;Risks of aspiration;Aspiration precautions;Compensatory strategy recommendations and rationale  -CP       SLP Evaluation Clinical Impression    SLP Swallowing Diagnosis functional oral phase;mild;pharyngeal dysphagia;esophageal dysphagia  -CP    Functional Impact risk of aspiration/pneumonia  -CP       SLP Treatment Clinical Impressions    Care Plan Review evaluation/treatment results reviewed  -CP       Recommendations    SLP Diet Recommendation regular textures;thin liquids  -CP    Recommended Precautions and Strategies upright posture during/after eating;small bites of food and sips of liquid;alternate between small bites of food and sips of liquid;hard swallow with each bite or sip;general aspiration precautions;reflux precautions  -CP    Oral Care Recommendations Oral Care before breakfast, after meals and PRN  -CP    SLP Rec. for Method of Medication Administration meds whole;meds crushed;as tolerated  -CP    Monitor for Signs of Aspiration yes;notify SLP if any concerns;cough;gurgly voice;throat clearing  -CP    Anticipated Discharge Disposition (SLP) home with OP services  -CP    Demonstrates Need for Referral to Another Service gastroenterology;otolaryngology (ENT)  -CP          User Key  (r) = Recorded By, (t) = Taken By, (c) = Cosigned By    Initials Name Provider Type    CP Amanda Valentino SLP Speech and Language Pathologist                                           Time Calculation:                     LAZARUS Medley  11/18/2022

## 2022-11-29 ENCOUNTER — PATIENT MESSAGE (OUTPATIENT)
Dept: FAMILY MEDICINE CLINIC | Facility: CLINIC | Age: 72
End: 2022-11-29

## 2022-12-18 RX ORDER — ATORVASTATIN CALCIUM 20 MG/1
TABLET, FILM COATED ORAL
Qty: 90 TABLET | Refills: 2 | Status: SHIPPED | OUTPATIENT
Start: 2022-12-18 | End: 2023-02-23

## 2023-02-21 ENCOUNTER — OFFICE VISIT (OUTPATIENT)
Dept: FAMILY MEDICINE CLINIC | Facility: CLINIC | Age: 73
End: 2023-02-21
Payer: MEDICARE

## 2023-02-21 ENCOUNTER — LAB (OUTPATIENT)
Dept: FAMILY MEDICINE CLINIC | Facility: CLINIC | Age: 73
End: 2023-02-21
Payer: MEDICARE

## 2023-02-21 VITALS
WEIGHT: 133 LBS | SYSTOLIC BLOOD PRESSURE: 122 MMHG | TEMPERATURE: 96.8 F | HEART RATE: 59 BPM | DIASTOLIC BLOOD PRESSURE: 86 MMHG | RESPIRATION RATE: 16 BRPM | HEIGHT: 66 IN | BODY MASS INDEX: 21.38 KG/M2 | OXYGEN SATURATION: 99 %

## 2023-02-21 DIAGNOSIS — E55.9 VITAMIN D DEFICIENCY: ICD-10-CM

## 2023-02-21 DIAGNOSIS — G20 PARKINSON'S DISEASE: ICD-10-CM

## 2023-02-21 DIAGNOSIS — L71.9 ROSACEA: ICD-10-CM

## 2023-02-21 DIAGNOSIS — F41.1 GENERALIZED ANXIETY DISORDER: ICD-10-CM

## 2023-02-21 DIAGNOSIS — M81.0 OSTEOPOROSIS, UNSPECIFIED OSTEOPOROSIS TYPE, UNSPECIFIED PATHOLOGICAL FRACTURE PRESENCE: ICD-10-CM

## 2023-02-21 DIAGNOSIS — Z00.00 MEDICARE ANNUAL WELLNESS VISIT, SUBSEQUENT: Primary | ICD-10-CM

## 2023-02-21 PROCEDURE — 80053 COMPREHEN METABOLIC PANEL: CPT | Performed by: INTERNAL MEDICINE

## 2023-02-21 PROCEDURE — 80061 LIPID PANEL: CPT | Performed by: INTERNAL MEDICINE

## 2023-02-21 PROCEDURE — 82607 VITAMIN B-12: CPT | Performed by: INTERNAL MEDICINE

## 2023-02-21 PROCEDURE — 82746 ASSAY OF FOLIC ACID SERUM: CPT | Performed by: INTERNAL MEDICINE

## 2023-02-21 PROCEDURE — 84443 ASSAY THYROID STIM HORMONE: CPT | Performed by: INTERNAL MEDICINE

## 2023-02-21 PROCEDURE — 1160F RVW MEDS BY RX/DR IN RCRD: CPT | Performed by: INTERNAL MEDICINE

## 2023-02-21 PROCEDURE — 85025 COMPLETE CBC W/AUTO DIFF WBC: CPT | Performed by: INTERNAL MEDICINE

## 2023-02-21 PROCEDURE — 1170F FXNL STATUS ASSESSED: CPT | Performed by: INTERNAL MEDICINE

## 2023-02-21 PROCEDURE — 99214 OFFICE O/P EST MOD 30 MIN: CPT | Performed by: INTERNAL MEDICINE

## 2023-02-21 PROCEDURE — 36415 COLL VENOUS BLD VENIPUNCTURE: CPT | Performed by: INTERNAL MEDICINE

## 2023-02-21 PROCEDURE — 82306 VITAMIN D 25 HYDROXY: CPT | Performed by: INTERNAL MEDICINE

## 2023-02-21 PROCEDURE — G0439 PPPS, SUBSEQ VISIT: HCPCS | Performed by: INTERNAL MEDICINE

## 2023-02-21 RX ORDER — MIDODRINE HYDROCHLORIDE 5 MG/1
TABLET ORAL
COMMUNITY
Start: 2023-02-21

## 2023-02-21 RX ORDER — FAMOTIDINE 40 MG/1
TABLET, FILM COATED ORAL
COMMUNITY
Start: 2023-01-31

## 2023-02-21 NOTE — PROGRESS NOTES
"The ABCs of the Annual Wellness Visit  Subsequent Medicare Wellness Visit    Subjective    Breanne Nicole is a 72 y.o. female who presents for a Subsequent Medicare Wellness Visit.    Sleep  The patient is sleeping well. She get up once to go to the restroom. She does go back to sleep easily.  She goes to bed at 10:00 PM, and gets up at 7:30 AM so she can take her medicine at breakfast.    Acid reflux  The patient was not doing well with talking. She asked to go to speech therapy. She had to get a referral from ENT, and when the ENT saw her , she was told that that she had a lot of inflammation. She was given famotidine for 2 weeks. She thought she did not want to keep taking it; however, then it actually got better. She takes it if she feels yucky. She has discontinued sodas.     Dizziness  The patient was given midodrine because she was told that she has orthostatic hypotension. She was passing out. She, and one point,  almost wanted to go to the emergency room. She states that it was really bad. She feels midodrine has helped tremendously. She takes it with the Sinemet because it raises her blood pressure.     Anxiety  The patient was sent to a psychiatrist because she had so much anxiety. She now has panic attacks, especially driving. She states that over the years, they psychiatrist tapered her up slowly because she is very sensitive to medicine, and she is now on 30 mg. She feels it is making a difference. She no longer has horrible chest pain or panic attacks. She does have tremors at times. She does not drive interstates due to panic attacks.     Dermatology  She states that she is still using MetroGel for her face. Her nose has a \"breakout.\" She sees a dermatologist, Dr. Thibodeaux.  She denies having spots removed recently.     Hyperlipidemia  She is taking her cholesterol medication. She does not feel like it is making her muscles any sorer than what they already were.    Osteoporosis  The patient is still " getting cramps in her legs. She feels the cramps more when she exercises. She has been walking a lot lately. She does Zoom for yoga strength 2 times a week, and does HIT for high intensity workout. She does look forward to exercise now.  She would like injections in her knee; however, not today.  She does not use a cane. She has good strength. She does not go up and down stairs without a banister,  and her house does not have a banister. She did add a ramp and banister to her house. She can do a few stairs without a banister. When doing laundry, she will hold on to the railing, goes down the steps backwards, and pulls the basket down the steps. She states has noticed that it is hard to go from the bedroom to the bathroom, and is trying to find a way to get there more safely. She fell the other day when going up the ramp.     The patient went to the emergency room last year because she thought she was having a heart attack; however, it has not happened again, although she does feel pressure at times, and feels it could be indigestion. She does take Pepcid AC. She has 2 sisters that have a medical history for heart attacks, and one is 7 years younger than her. She does exercises more.    Health maintenance  The patient's last mammogram was in the fall of 2022. She does self-breast exams. Her last Pap smear was in 2019. She will wait to have a DEXA scan in 05/2023. She is not taking calcium and vitamin D. She will finish [employer] on 02/28/2023. She does get calcium through milk. She drinks almond chocolate, which is 450 mg, and plain soy, which is 300 mg. The patient's memory is mainly good, and she does not have too much trouble. She did a PD at [facility], and the results were normal. She does take carbidopa. She tries to eat a healthy diet due to being told she would have to take another medications if her body was not absorbing the carbidopa as it should. She gets anxious and then she loses her thoughts, and feels as  though it is embarrassing. She is in a book club, and that is when she feels she can not remember. Her eyesight is blurry. She has a hard time seeing at night. She can not read the small lettering on the prescription bottles. Her hearing is still doing okay. She has stopped eating so many beans; however, she does eat some chicken. She does need a tetanus vaccination.     The following portions of the patient's history were reviewed and   updated as appropriate: allergies, current medications, past family history, past medical history, past social history, past surgical history and problem list.    Compared to one year ago, the patient feels her physical   health is worse.    Compared to one year ago, the patient feels her mental   health is the same.    Recent Hospitalizations:  She was not admitted to the hospital during the last year.       Current Medical Providers:  Patient Care Team:  Bobbi Rendon MD as PCP - General (Internal Medicine)  Zaire Ohara MD as Consulting Physician (Cardiology)    Outpatient Medications Prior to Visit   Medication Sig Dispense Refill   • calcium carb-cholecalciferol 600-800 MG-UNIT tablet Take 1 tablet by mouth.     • carbidopa-levodopa (SINEMET)  MG per tablet      • Cyanocobalamin (VITAMIN B-12 PO) Take  by mouth.     • escitalopram (LEXAPRO) 10 MG tablet Take 30 mg by mouth Daily.     • famotidine (PEPCID) 40 MG tablet      • metroNIDAZOLE (METROGEL) 0.75 % gel APPLY THIN LAYER TOPICALLY TO THE AFFECTED AREA TWICE DAILY IN THE MORNING AND IN THE EVENING FOR ROSACEA     • midodrine (PROAMATINE) 5 MG tablet Take  by mouth.     • polyethylene glycol (MIRALAX) packet Take 17 g by mouth.     • VITAMIN D PO Take  by mouth.     • atorvastatin (LIPITOR) 20 MG tablet TAKE ONE TABLET BY MOUTH DAILY 90 tablet 2   • Easy Touch Pen Needles 31G X 5 MM misc USE AS DIRECTED WITH TYMLOS     • MAGNESIUM ASPARTATE PO Take  by mouth.     • Tymlos 3120 MCG/1.56ML solution  "pen-injector Inject 80 mcg UNDER THE SKIN ONCE daily     • Wheat Dextrin (BENEFIBER ON THE GO PO)        No facility-administered medications prior to visit.       No opioid medication identified on active medication list. I have reviewed chart for other potential  high risk medication/s and harmful drug interactions in the elderly.          Aspirin is not on active medication list.  Aspirin use is not indicated based on review of current medical condition/s. Risk of harm outweighs potential benefits.  .    Patient Active Problem List   Diagnosis   • Coronary artery disease involving native coronary artery without angina pectoris   • Chest pain   • Chest pressure   • Heart murmur   • Hyperlipidemia   • Fatigue   • Family history of coronary artery disease   • Shortness of breath   • Palpitations   • Allergic state   • Anemia   • B-cell lymphoma (HCC)   • Constipation   • Medicare annual wellness visit, subsequent   • Generalized anxiety disorder   • Irritable bowel syndrome   • Osteoarthritis   • Osteoporosis   • Parkinson's disease (HCC)   • Rosacea   • Anxiety about health   • Dystonia   • Tremor   • Vitamin D deficiency     Advance Care Planning  Advance Directive is not on file.  ACP discussion was held with the patient during this visit. Patient has an advance directive (not in EMR), copy requested.     Objective    Vitals:    02/21/23 1331   BP: 122/86   Pulse: 59   Resp: 16   Temp: 96.8 °F (36 °C)   SpO2: 99%   Weight: 60.3 kg (133 lb)   Height: 167.6 cm (66\")     Estimated body mass index is 21.47 kg/m² as calculated from the following:    Height as of this encounter: 167.6 cm (66\").    Weight as of this encounter: 60.3 kg (133 lb).    BMI is within normal parameters. No other follow-up for BMI required.      Does the patient have evidence of cognitive impairment? No    Lab Results   Component Value Date    TRIG 66 02/21/2023    HDL 90 (H) 02/21/2023    LDL 69 02/21/2023    VLDL 13 02/21/2023        HEALTH " RISK ASSESSMENT    Smoking Status:  Social History     Tobacco Use   Smoking Status Never   Smokeless Tobacco Never     Alcohol Consumption:  Social History     Substance and Sexual Activity   Alcohol Use Yes   • Alcohol/week: 1.0 standard drink   • Types: 1 Cans of beer per week    Comment: 3oz 3-4 days/week     Fall Risk Screen:    NANCY Fall Risk Assessment was completed, and patient is at HIGH risk for falls. Assessment completed on:2/21/2023    Depression Screening:  PHQ-2/PHQ-9 Depression Screening 2/21/2023   Little Interest or Pleasure in Doing Things 0-->not at all   Feeling Down, Depressed or Hopeless 0-->not at all   PHQ-9: Brief Depression Severity Measure Score 0       Health Habits and Functional and Cognitive Screening:  Functional & Cognitive Status 2/21/2023   Do you have difficulty preparing food and eating? No   Do you have difficulty bathing yourself, getting dressed or grooming yourself? No   Do you have difficulty using the toilet? No   Do you have difficulty moving around from place to place? No   Do you have trouble with steps or getting out of a bed or a chair? No   Current Diet Well Balanced Diet   Dental Exam Up to date   Eye Exam Not up to date   Exercise (times per week) 4 times per week   Current Exercises Include Walking        Exercise Comment yoga and nichole   Current Exercise Activities Include -   Do you need help using the phone?  No   Are you deaf or do you have serious difficulty hearing?  No   Do you need help with transportation? No   Do you need help shopping? No   Do you need help preparing meals?  No   Do you need help with housework?  No   Do you need help with laundry? No   Do you need help taking your medications? No   Do you need help managing money? No   Do you ever drive or ride in a car without wearing a seat belt? No   Have you felt unusual stress, anger or loneliness in the last month? No   Who do you live with? Spouse   If you need help, do you have trouble finding  "someone available to you? No   Have you been bothered in the last four weeks by sexual problems? -   Do you have difficulty concentrating, remembering or making decisions? No       Age-appropriate Screening Schedule:  Refer to the list below for future screening recommendations based on patient's age, sex and/or medical conditions. Orders for these recommended tests are listed in the plan section. The patient has been provided with a written plan.    Health Maintenance   Topic Date Due   • TDAP/TD VACCINES (1 - Tdap) Never done   • ZOSTER VACCINE (2 of 2) 12/06/2018   • DXA SCAN  05/27/2023   • ANNUAL WELLNESS VISIT  02/21/2024   • LIPID PANEL  02/21/2024   • MAMMOGRAM  11/08/2024   • COLORECTAL CANCER SCREENING  12/16/2024   • HEPATITIS C SCREENING  Completed   • COVID-19 Vaccine  Completed   • INFLUENZA VACCINE  Completed   • Pneumococcal Vaccine 65+  Completed                CMS Preventative Services Quick Reference  Risk Factors Identified During Encounter  Fall Risk-High or Moderate: Discussed Fall Prevention in the home  The above risks/problems have been discussed with the patient.  Pertinent information has been shared with the patient in the After Visit Summary.  An After Visit Summary and PPPS were made available to the patient.    Follow Up:   Next Medicare Wellness visit to be scheduled in 1 year.       Additional E&M Note during same encounter follows:  Patient has multiple medical problems which are significant and separately identifiable that require additional work above and beyond the Medicare Wellness Visit.      Chief Complaint  Medicare Wellness-subsequent    Subjective        HPI  Breanne Nicole is also being seen today for        Objective   Vital Signs:  /86   Pulse 59   Temp 96.8 °F (36 °C)   Resp 16   Ht 167.6 cm (66\")   Wt 60.3 kg (133 lb)   SpO2 99%   BMI 21.47 kg/m²     Physical Exam  Vitals and nursing note reviewed.   Constitutional:       Appearance: Normal appearance. She " is well-developed.   HENT:      Head: Normocephalic and atraumatic.      Right Ear: Tympanic membrane normal.      Left Ear: Tympanic membrane normal.      Nose: No rhinorrhea.      Mouth/Throat:      Pharynx: No posterior oropharyngeal erythema.   Eyes:      Pupils: Pupils are equal, round, and reactive to light.   Cardiovascular:      Rate and Rhythm: Normal rate and regular rhythm.      Pulses: Normal pulses.      Heart sounds: Normal heart sounds. No murmur heard.  Pulmonary:      Effort: Pulmonary effort is normal.      Breath sounds: Normal breath sounds.   Chest:   Breasts:     Right: No inverted nipple or mass.      Left: No inverted nipple or mass.   Abdominal:      General: Bowel sounds are normal. There is no distension.      Palpations: Abdomen is soft.   Musculoskeletal:         General: No tenderness.      Cervical back: Normal range of motion and neck supple.   Skin:     Capillary Refill: Capillary refill takes less than 2 seconds.   Neurological:      Mental Status: She is alert and oriented to person, place, and time.      Comments: tremor   Psychiatric:         Mood and Affect: Mood normal.         Behavior: Behavior normal.          The following data was reviewed by: Bobbi Rendon MD on 02/21/2023:  Common labs    Common Labs 9/9/22 2/21/23 2/21/23 2/21/23     1423 1423 1423   Glucose 90   80   BUN 15   17   Creatinine 0.66   0.73   Sodium 136   135 (A)   Potassium 4.3   4.3   Chloride 100   95 (A)   Calcium 9.7   9.7   Albumin    4.7   Total Bilirubin    0.5   Alkaline Phosphatase    87   AST (SGOT)    24   ALT (SGPT)    12   WBC  6.17     Hemoglobin  13.0     Hematocrit  39.6     Platelets  234     Total Cholesterol   172    Triglycerides   66    HDL Cholesterol   90 (A)    LDL Cholesterol    69    (A) Abnormal value            Data reviewed: Radiologic studies dexascan           Assessment and Plan   Diagnoses and all orders for this visit:    1. Medicare annual wellness visit,  subsequent (Primary)  Comments:  discussed all recommendations  Orders:  -     Comprehensive Metabolic Panel  -     CBC Auto Differential  -     Vitamin D,25-Hydroxy  -     TSH  -     Lipid Panel  -     Vitamin B12 & Folate    2. Generalized anxiety disorder    3. Osteoporosis, unspecified osteoporosis type, unspecified pathological fracture presence  Comments:  finishing up [facility]  Orders:  -     DEXA Bone Density Axial; Future    4. Parkinson's disease (HCC)    5. Rosacea    6. Vitamin D deficiency  -     Vitamin D,25-Hydroxy    1. Medicare annual wellness visit, subsequent (Primary)  - The patient will have a laboratory work-up today.     2. Gastroesophageal reflux disease, unspecified whether esophagitis present  - The patient will continue with famotidine 20 mg tablet.    3. Orthostatic hypotension  - The patient will have a laboratory work-up today.     4. Anxiety  - The patient will continue escitalopram 30 mg tablet.     5. Osteoporosis  - The patient will have a DEXA scan in 05/2023.     6. Health maintenance  - The patient will have a tetanus vaccination at her convenience.          I spent 30 minutes caring for Breanne on this date of service. This time includes time spent by me in the following activities:reviewing tests, obtaining and/or reviewing a separately obtained history, performing a medically appropriate examination and/or evaluation  and counseling and educating the patient/family/caregiver  Follow Up   Return in about 1 year (around 2/21/2024), or if symptoms worsen or fail to improve, for Medicare Wellness.  Patient was given instructions and counseling regarding her condition or for health maintenance advice. Please see specific information pulled into the AVS if appropriate.       Transcribed from ambient dictation for Bobbi Rendon MD by Robina Martinez.  02/21/23   15:58 EST    Patient or patient representative verbalized consent to the visit recording.  I have personally performed  the services described in this document as transcribed by the above individual, and it is both accurate and complete.  Bobbi Rendon MD  2/23/2023  21:33 EST

## 2023-02-22 LAB
25(OH)D3 SERPL-MCNC: 52.3 NG/ML (ref 30–100)
ALBUMIN SERPL-MCNC: 4.7 G/DL (ref 3.5–5.2)
ALBUMIN/GLOB SERPL: 2 G/DL
ALP SERPL-CCNC: 87 U/L (ref 39–117)
ALT SERPL W P-5'-P-CCNC: 12 U/L (ref 1–33)
ANION GAP SERPL CALCULATED.3IONS-SCNC: 9 MMOL/L (ref 5–15)
AST SERPL-CCNC: 24 U/L (ref 1–32)
BASOPHILS # BLD AUTO: 0.03 10*3/MM3 (ref 0–0.2)
BASOPHILS NFR BLD AUTO: 0.5 % (ref 0–1.5)
BILIRUB SERPL-MCNC: 0.5 MG/DL (ref 0–1.2)
BUN SERPL-MCNC: 17 MG/DL (ref 8–23)
BUN/CREAT SERPL: 23.3 (ref 7–25)
CALCIUM SPEC-SCNC: 9.7 MG/DL (ref 8.6–10.5)
CHLORIDE SERPL-SCNC: 95 MMOL/L (ref 98–107)
CHOLEST SERPL-MCNC: 172 MG/DL (ref 0–200)
CO2 SERPL-SCNC: 31 MMOL/L (ref 22–29)
CREAT SERPL-MCNC: 0.73 MG/DL (ref 0.57–1)
DEPRECATED RDW RBC AUTO: 43.3 FL (ref 37–54)
EGFRCR SERPLBLD CKD-EPI 2021: 88 ML/MIN/1.73
EOSINOPHIL # BLD AUTO: 0.38 10*3/MM3 (ref 0–0.4)
EOSINOPHIL NFR BLD AUTO: 6.2 % (ref 0.3–6.2)
ERYTHROCYTE [DISTWIDTH] IN BLOOD BY AUTOMATED COUNT: 12.3 % (ref 12.3–15.4)
FOLATE SERPL-MCNC: >20 NG/ML (ref 4.78–24.2)
GLOBULIN UR ELPH-MCNC: 2.4 GM/DL
GLUCOSE SERPL-MCNC: 80 MG/DL (ref 65–99)
HCT VFR BLD AUTO: 39.6 % (ref 34–46.6)
HDLC SERPL-MCNC: 90 MG/DL (ref 40–60)
HGB BLD-MCNC: 13 G/DL (ref 12–15.9)
IMM GRANULOCYTES # BLD AUTO: 0.01 10*3/MM3 (ref 0–0.05)
IMM GRANULOCYTES NFR BLD AUTO: 0.2 % (ref 0–0.5)
LDLC SERPL CALC-MCNC: 69 MG/DL (ref 0–100)
LDLC/HDLC SERPL: 0.76 {RATIO}
LYMPHOCYTES # BLD AUTO: 1.55 10*3/MM3 (ref 0.7–3.1)
LYMPHOCYTES NFR BLD AUTO: 25.1 % (ref 19.6–45.3)
MCH RBC QN AUTO: 31.6 PG (ref 26.6–33)
MCHC RBC AUTO-ENTMCNC: 32.8 G/DL (ref 31.5–35.7)
MCV RBC AUTO: 96.4 FL (ref 79–97)
MONOCYTES # BLD AUTO: 0.62 10*3/MM3 (ref 0.1–0.9)
MONOCYTES NFR BLD AUTO: 10 % (ref 5–12)
NEUTROPHILS NFR BLD AUTO: 3.58 10*3/MM3 (ref 1.7–7)
NEUTROPHILS NFR BLD AUTO: 58 % (ref 42.7–76)
NRBC BLD AUTO-RTO: 0 /100 WBC (ref 0–0.2)
PLATELET # BLD AUTO: 234 10*3/MM3 (ref 140–450)
PMV BLD AUTO: 9.7 FL (ref 6–12)
POTASSIUM SERPL-SCNC: 4.3 MMOL/L (ref 3.5–5.2)
PROT SERPL-MCNC: 7.1 G/DL (ref 6–8.5)
RBC # BLD AUTO: 4.11 10*6/MM3 (ref 3.77–5.28)
SODIUM SERPL-SCNC: 135 MMOL/L (ref 136–145)
TRIGL SERPL-MCNC: 66 MG/DL (ref 0–150)
TSH SERPL DL<=0.05 MIU/L-ACNC: 1.44 UIU/ML (ref 0.27–4.2)
VIT B12 BLD-MCNC: 1070 PG/ML (ref 211–946)
VLDLC SERPL-MCNC: 13 MG/DL (ref 5–40)
WBC NRBC COR # BLD: 6.17 10*3/MM3 (ref 3.4–10.8)

## 2023-02-23 ENCOUNTER — TELEPHONE (OUTPATIENT)
Dept: FAMILY MEDICINE CLINIC | Facility: CLINIC | Age: 73
End: 2023-02-23
Payer: MEDICARE

## 2023-02-23 ENCOUNTER — PATIENT MESSAGE (OUTPATIENT)
Dept: FAMILY MEDICINE CLINIC | Facility: CLINIC | Age: 73
End: 2023-02-23
Payer: MEDICARE

## 2023-02-23 RX ORDER — ATORVASTATIN CALCIUM 10 MG/1
10 TABLET, FILM COATED ORAL DAILY
Qty: 90 TABLET | Refills: 1 | Status: SHIPPED | OUTPATIENT
Start: 2023-02-23

## 2023-02-23 NOTE — PROGRESS NOTES
Please call the patient regarding her abnormal result. Does not check mychart-   Labs all [facility]- cholesterol is amazing

## 2023-02-23 NOTE — TELEPHONE ENCOUNTER
From: Breanne Nicole  To: Bobbi Rendon  Sent: 2/23/2023 12:38 PM EST  Subject: Atorvastatin 20 mg    Can I reduce this medication since my cholesterol is so good? Thanks

## 2023-02-23 NOTE — TELEPHONE ENCOUNTER
Hub to share  Please call the patient regarding her abnormal result. Does not check mychart-   [facility]- cholesterol is amazing

## 2023-07-21 ENCOUNTER — LAB (OUTPATIENT)
Dept: LAB | Facility: HOSPITAL | Age: 73
End: 2023-07-21
Payer: MEDICARE

## 2023-07-21 DIAGNOSIS — R10.13 DYSPEPSIA: ICD-10-CM

## 2023-07-21 PROCEDURE — 83013 H PYLORI (C-13) BREATH: CPT

## 2023-07-23 LAB — UREA BREATH TEST QL: NEGATIVE

## 2023-07-25 ENCOUNTER — LAB (OUTPATIENT)
Dept: FAMILY MEDICINE CLINIC | Facility: CLINIC | Age: 73
End: 2023-07-25
Payer: MEDICARE

## 2023-07-25 DIAGNOSIS — E78.2 MIXED HYPERLIPIDEMIA: Primary | ICD-10-CM

## 2023-07-25 DIAGNOSIS — E78.2 MIXED HYPERLIPIDEMIA: ICD-10-CM

## 2023-07-25 LAB
CHOLEST SERPL-MCNC: 163 MG/DL (ref 0–200)
HDLC SERPL-MCNC: 67 MG/DL (ref 40–60)
LDLC SERPL CALC-MCNC: 86 MG/DL (ref 0–100)
LDLC/HDLC SERPL: 1.29 {RATIO}
TRIGL SERPL-MCNC: 47 MG/DL (ref 0–150)
VLDLC SERPL-MCNC: 10 MG/DL (ref 5–40)

## 2023-07-25 PROCEDURE — 80061 LIPID PANEL: CPT | Performed by: STUDENT IN AN ORGANIZED HEALTH CARE EDUCATION/TRAINING PROGRAM

## 2023-07-25 PROCEDURE — 36415 COLL VENOUS BLD VENIPUNCTURE: CPT

## 2023-08-07 ENCOUNTER — OFFICE VISIT (OUTPATIENT)
Dept: CARDIOLOGY | Facility: CLINIC | Age: 73
End: 2023-08-07
Payer: MEDICARE

## 2023-08-07 VITALS
BODY MASS INDEX: 21.13 KG/M2 | SYSTOLIC BLOOD PRESSURE: 110 MMHG | OXYGEN SATURATION: 96 % | HEART RATE: 62 BPM | HEIGHT: 66 IN | WEIGHT: 131.5 LBS | DIASTOLIC BLOOD PRESSURE: 74 MMHG

## 2023-08-07 DIAGNOSIS — E78.00 PURE HYPERCHOLESTEROLEMIA: Primary | ICD-10-CM

## 2023-08-07 DIAGNOSIS — I25.118 CORONARY ARTERY DISEASE OF NATIVE ARTERY OF NATIVE HEART WITH STABLE ANGINA PECTORIS: ICD-10-CM

## 2023-08-07 PROCEDURE — 1159F MED LIST DOCD IN RCRD: CPT | Performed by: INTERNAL MEDICINE

## 2023-08-07 PROCEDURE — 99213 OFFICE O/P EST LOW 20 MIN: CPT | Performed by: INTERNAL MEDICINE

## 2023-08-07 PROCEDURE — 1160F RVW MEDS BY RX/DR IN RCRD: CPT | Performed by: INTERNAL MEDICINE

## 2023-08-07 NOTE — PROGRESS NOTES
"    Subjective:     Encounter Date:08/07/2023      Patient ID: Breanne Nicole is a 73 y.o. female.    Chief Complaint:  History of Present Illness 71-year-old white female with history of coronary disease hyperlipidemia presents to my office for a follow-up.  Patient is currently stable without concerns of chest pain or shortness of breath at rest or exertion radiograms any PND orthopnea.  She has occasional palpitation with dizziness.  No syncope or swelling of the feet.  She is taking her medicines regularly.  She does not smoke.    The following portions of the patient's history were reviewed and updated as appropriate: allergies, current medications, past family history, past medical history, past social history, past surgical history, and problem list.  Past Medical History:   Diagnosis Date    Aneurysm 2015 CT scan during CA treat    Anxiety Better    Quit taking meds    Arthritis     Cancer     Cancer free 5 years    Coronary artery disease     GERD (gastroesophageal reflux disease)     Head of bed raised and pepcid ac    Heart murmur     Hyperlipidemia     Irritable bowel syndrome chronic constipation    Low back pain right, lower scapula    Osteopenia     ?    Parkinson's disease     Scoliosis has progressed    Tremor     PD     Past Surgical History:   Procedure Laterality Date    ADENOIDECTOMY  1957    BREAST SURGERY  1971    Cystsk.    COLONOSCOPY  2017    LYMPH NODE BIOPSY  2015    TONSILLECTOMY  1957     /74   Pulse 62   Ht 167.6 cm (66\")   Wt 59.6 kg (131 lb 8 oz)   SpO2 96%   BMI 21.22 kg/mý   Family History   Problem Relation Age of Onset    Heart disease Mother         CHF, heart attacks    Hyperlipidemia Mother     Heart disease Father         Heart desease    Heart attack Father     Heart disease Sister         Heart attack age58    Heart attack Sister     Hearing loss Sister     Heart attack Sister     Heart disease Sister         age 51    Hearing loss Sister     Heart attack Sister  "    Anxiety disorder Son     Depression Son     Diabetes Brother     Hearing loss Brother     Hyperlipidemia Brother     Hypertension Brother     Hearing loss Brother     Hyperlipidemia Brother     Mental illness Paternal Aunt        Current Outpatient Medications:     atorvastatin (Lipitor) 10 MG tablet, Take 1 tablet by mouth Daily., Disp: 90 tablet, Rfl: 1    calcium carb-cholecalciferol 600-800 MG-UNIT tablet, Take 1 tablet by mouth., Disp: , Rfl:     carbidopa-levodopa (SINEMET)  MG per tablet, , Disp: , Rfl:     Cyanocobalamin (VITAMIN B-12 PO), Take  by mouth., Disp: , Rfl:     escitalopram (LEXAPRO) 10 MG tablet, Take 3 tablets by mouth Daily. She takes 30 mg all at night before bed, Disp: , Rfl:     famotidine (PEPCID) 40 MG tablet, , Disp: , Rfl:     metroNIDAZOLE (METROGEL) 0.75 % gel, APPLY THIN LAYER TOPICALLY TO THE AFFECTED AREA TWICE DAILY IN THE MORNING AND IN THE EVENING FOR ROSACEA, Disp: , Rfl:     midodrine (PROAMATINE) 5 MG tablet, Take  by mouth 3 (Three) Times a Day. 10 mg in morning & 5mg afternoon., Disp: , Rfl:     polyethylene glycol (MIRALAX) packet, Take 17 g by mouth. Has to take all the time or she isn't able to go/, Disp: , Rfl:     VITAMIN D PO, Take  by mouth., Disp: , Rfl:   Allergies   Allergen Reactions    Codeine Nausea Only    Penicillins Diarrhea and Nausea And Vomiting     Social History     Socioeconomic History    Marital status:    Tobacco Use    Smoking status: Never     Passive exposure: Never    Smokeless tobacco: Never   Vaping Use    Vaping Use: Never used   Substance and Sexual Activity    Alcohol use: Yes     Comment: 3oz 3-4 days/week    Drug use: Never    Sexual activity: Yes     Partners: Male     Birth control/protection: Post-menopausal     Review of Systems   Constitutional: Positive for malaise/fatigue.   Cardiovascular:  Positive for palpitations. Negative for chest pain, dyspnea on exertion and leg swelling.   Respiratory:  Negative for cough  and shortness of breath.    Gastrointestinal:  Negative for abdominal pain, nausea and vomiting.   Neurological:  Positive for dizziness and light-headedness. Negative for focal weakness, headaches and numbness.   All other systems reviewed and are negative.           Objective:     Constitutional:       Appearance: Well-developed.   Eyes:      General: No scleral icterus.     Conjunctiva/sclera: Conjunctivae normal.   HENT:      Head: Normocephalic and atraumatic.   Neck:      Vascular: No carotid bruit or JVD.   Pulmonary:      Effort: Pulmonary effort is normal.      Breath sounds: Normal breath sounds. No wheezing. No rales.   Cardiovascular:      Normal rate. Regular rhythm.   Pulses:     Intact distal pulses.   Abdominal:      General: Bowel sounds are normal.      Palpations: Abdomen is soft.   Musculoskeletal:      Cervical back: Normal range of motion and neck supple. Skin:     General: Skin is warm and dry.      Findings: No rash.   Neurological:      Mental Status: Alert.     Procedures    Lab Review:         MDM    #1 coronary disease  Patient has nonobstructive disease in the past and is currently stable on medications with normal LV function  2.  Hyperlipidemia  Patient is on statins and the lipid levels are well within normal limits    Patient's previous medical records, labs, and EKG were reviewed and discussed with the patient at today's visit.

## 2023-09-11 ENCOUNTER — TELEPHONE (OUTPATIENT)
Dept: CARDIOLOGY | Facility: CLINIC | Age: 73
End: 2023-09-11
Payer: MEDICARE

## 2023-09-14 ENCOUNTER — TELEPHONE (OUTPATIENT)
Dept: CARDIOLOGY | Facility: CLINIC | Age: 73
End: 2023-09-14
Payer: MEDICARE

## 2023-09-14 NOTE — TELEPHONE ENCOUNTER
----- Message from Breanne Nicole sent at 9/14/2023 12:15 PM EDT -----  Regarding: Aneurysm noted in 2015  Contact: [phone]  Dr CARRASCO asked me to send him the 2015 results of a CT scan for cancer follow up because there was an ascending aortic aneurysm of 4.0 noted in the report. I've had no follow up on it because I was never referred to a cardiologist for it. I hand delivered a printed copy of the report to the front office a few days after my August 7 appt at Dr CARRASCO's request I still haven't heard any thing so last week I had [facility] to email another copy to Dr. CARRASCO's office.  I'd like to know what to do for follow up, please..

## 2023-09-14 NOTE — TELEPHONE ENCOUNTER
4/27 2.4 CT Head Without Contrast (11/27/2021 19:19)     CT Cervical Spine wo Contrast (11/27/2021 19:19)     SCANNED - IMAGING (09/22/2015)     Patient is wanting to know about her results of these scans of a possible aneurysms?

## 2023-09-20 ENCOUNTER — LAB (OUTPATIENT)
Dept: LAB | Facility: HOSPITAL | Age: 73
End: 2023-09-20
Payer: MEDICARE

## 2023-09-20 ENCOUNTER — TRANSCRIBE ORDERS (OUTPATIENT)
Dept: LAB | Facility: HOSPITAL | Age: 73
End: 2023-09-20
Payer: MEDICARE

## 2023-09-20 DIAGNOSIS — M81.0 SENILE OSTEOPOROSIS: ICD-10-CM

## 2023-09-20 DIAGNOSIS — M81.0 SENILE OSTEOPOROSIS: Primary | ICD-10-CM

## 2023-09-20 LAB
25(OH)D3 SERPL-MCNC: 51.8 NG/ML (ref 30–100)
ANION GAP SERPL CALCULATED.3IONS-SCNC: 10.6 MMOL/L (ref 5–15)
BUN SERPL-MCNC: 12 MG/DL (ref 8–23)
BUN/CREAT SERPL: 16.4 (ref 7–25)
CALCIUM SPEC-SCNC: 9.5 MG/DL (ref 8.6–10.5)
CHLORIDE SERPL-SCNC: 100 MMOL/L (ref 98–107)
CO2 SERPL-SCNC: 26.4 MMOL/L (ref 22–29)
CREAT SERPL-MCNC: 0.73 MG/DL (ref 0.57–1)
EGFRCR SERPLBLD CKD-EPI 2021: 87.5 ML/MIN/1.73
GLUCOSE SERPL-MCNC: 84 MG/DL (ref 65–99)
POTASSIUM SERPL-SCNC: 4.2 MMOL/L (ref 3.5–5.2)
SODIUM SERPL-SCNC: 137 MMOL/L (ref 136–145)

## 2023-09-20 PROCEDURE — 80048 BASIC METABOLIC PNL TOTAL CA: CPT

## 2023-09-20 PROCEDURE — 36415 COLL VENOUS BLD VENIPUNCTURE: CPT

## 2023-09-20 PROCEDURE — 82306 VITAMIN D 25 HYDROXY: CPT

## 2023-09-25 RX ORDER — ATORVASTATIN CALCIUM 10 MG/1
TABLET, FILM COATED ORAL
Qty: 90 TABLET | Refills: 1 | Status: SHIPPED | OUTPATIENT
Start: 2023-09-25

## 2023-10-31 ENCOUNTER — TELEPHONE (OUTPATIENT)
Dept: FAMILY MEDICINE CLINIC | Facility: CLINIC | Age: 73
End: 2023-10-31
Payer: MEDICARE

## 2023-10-31 DIAGNOSIS — Z12.31 ENCOUNTER FOR SCREENING MAMMOGRAM FOR MALIGNANT NEOPLASM OF BREAST: Primary | ICD-10-CM

## 2023-10-31 NOTE — TELEPHONE ENCOUNTER
Caller: Breanne Nicole    Relationship: Self    Best call back number: [phone]    What orders are you requesting (i.e. lab or imaging): MAMMOGRAM    Where will you receive your lab/imaging services: [facility]

## 2024-02-20 DIAGNOSIS — Z12.31 ENCOUNTER FOR SCREENING MAMMOGRAM FOR MALIGNANT NEOPLASM OF BREAST: ICD-10-CM

## 2024-02-21 ENCOUNTER — OFFICE VISIT (OUTPATIENT)
Dept: CARDIOLOGY | Facility: CLINIC | Age: 74
End: 2024-02-21
Payer: MEDICARE

## 2024-02-21 VITALS
HEIGHT: 66 IN | DIASTOLIC BLOOD PRESSURE: 79 MMHG | HEART RATE: 60 BPM | WEIGHT: 132.5 LBS | SYSTOLIC BLOOD PRESSURE: 115 MMHG | OXYGEN SATURATION: 97 % | BODY MASS INDEX: 21.29 KG/M2

## 2024-02-21 DIAGNOSIS — E78.00 PURE HYPERCHOLESTEROLEMIA: Primary | ICD-10-CM

## 2024-02-21 PROCEDURE — 99213 OFFICE O/P EST LOW 20 MIN: CPT | Performed by: INTERNAL MEDICINE

## 2024-02-21 PROCEDURE — 1160F RVW MEDS BY RX/DR IN RCRD: CPT | Performed by: INTERNAL MEDICINE

## 2024-02-21 PROCEDURE — 1159F MED LIST DOCD IN RCRD: CPT | Performed by: INTERNAL MEDICINE

## 2024-02-21 RX ORDER — MIDODRINE HYDROCHLORIDE 5 MG/1
5 TABLET ORAL DAILY
COMMUNITY

## 2024-02-21 NOTE — PROGRESS NOTES
"    Subjective:     Encounter Date:02/21/2024      Patient ID: Breanne Nicole is a 73 y.o. female.    Chief Complaint:  History of Present Illness 71-year-old white female with history of coronary disease hyperlipidemia presents to my office for a follow-up.  Patient is currently stable without any signs of chest pain or shortness of breath at rest or exertion radiograms any PND orthopnea.  No palpitation.  Occasional dizziness.  No syncope or swelling of the feet.  Patient is taking all her medicines regularly.  Patient does not smoke.    The following portions of the patient's history were reviewed and updated as appropriate: allergies, current medications, past family history, past medical history, past social history, past surgical history, and problem list.  Past Medical History:   Diagnosis Date    Aneurysm 2015 CT scan during CA treat    Anxiety Better    Quit taking meds    Arthritis     Cancer     Cancer free 5 years    Coronary artery disease     GERD (gastroesophageal reflux disease)     Head of bed raised and pepcid ac    Heart murmur     Hyperlipidemia     Irritable bowel syndrome chronic constipation    Low back pain right, lower scapula    Osteopenia     ?    Parkinson's disease     Scoliosis has progressed    Tremor     PD     Past Surgical History:   Procedure Laterality Date    ADENOIDECTOMY  1957    BREAST SURGERY  1971    Cystsk.    COLONOSCOPY  2017    LYMPH NODE BIOPSY  2015    TONSILLECTOMY  1957     /79   Pulse 60   Ht 167.6 cm (66\")   Wt 60.1 kg (132 lb 8 oz)   SpO2 97%   BMI 21.39 kg/m²   Family History   Problem Relation Age of Onset    Heart disease Mother         CHF, heart attacks    Hyperlipidemia Mother     Heart disease Father         Heart desease    Heart attack Father     Heart disease Sister         Heart attack age60    Heart attack Sister     Hearing loss Sister     Heart attack Sister     Heart disease Sister         age 51    Hearing loss Sister     Heart attack " Sister     Anxiety disorder Son     Depression Son     Diabetes Brother     Hearing loss Brother     Hyperlipidemia Brother     Hypertension Brother     Hearing loss Brother     Hyperlipidemia Brother     Mental illness Paternal Aunt        Current Outpatient Medications:     atorvastatin (LIPITOR) 10 MG tablet, TAKE ONE TABLET BY MOUTH DAILY, Disp: 90 tablet, Rfl: 1    calcium carb-cholecalciferol 600-800 MG-UNIT tablet, Take 1 tablet by mouth., Disp: , Rfl:     carbidopa-levodopa (SINEMET)  MG per tablet, , Disp: , Rfl:     Cyanocobalamin (VITAMIN B-12 PO), Take  by mouth., Disp: , Rfl:     escitalopram (LEXAPRO) 10 MG tablet, Take 3 tablets by mouth Daily. She takes 30 mg all at night before bed, Disp: , Rfl:     famotidine (PEPCID) 40 MG tablet, , Disp: , Rfl:     metroNIDAZOLE (METROGEL) 0.75 % gel, APPLY THIN LAYER TOPICALLY TO THE AFFECTED AREA TWICE DAILY IN THE MORNING AND IN THE EVENING FOR ROSACEA, Disp: , Rfl:     midodrine (PROAMATINE) 5 MG tablet, Take 1 tablet by mouth Daily. In the morning, Disp: , Rfl:     polyethylene glycol (MIRALAX) packet, Take 17 g by mouth. Has to take all the time or she isn't able to go/, Disp: , Rfl:     VITAMIN D PO, Take  by mouth., Disp: , Rfl:   Allergies   Allergen Reactions    Codeine Nausea Only    Penicillins Diarrhea and Nausea And Vomiting     Social History     Socioeconomic History    Marital status:    Tobacco Use    Smoking status: Never     Passive exposure: Never    Smokeless tobacco: Never   Vaping Use    Vaping Use: Never used   Substance and Sexual Activity    Alcohol use: Yes     Comment: 3oz 3-4 days/week    Drug use: Never    Sexual activity: Yes     Partners: Male     Birth control/protection: Post-menopausal     Review of Systems   Constitutional: Negative for malaise/fatigue.   Cardiovascular:  Negative for chest pain, dyspnea on exertion, leg swelling and palpitations.   Respiratory:  Negative for cough and shortness of breath.     Gastrointestinal:  Negative for abdominal pain, nausea and vomiting.   Neurological:  Positive for light-headedness. Negative for dizziness, focal weakness, headaches and numbness.   All other systems reviewed and are negative.             Objective:     Constitutional:       Appearance: Well-developed.   Eyes:      General: No scleral icterus.     Conjunctiva/sclera: Conjunctivae normal.   HENT:      Head: Normocephalic and atraumatic.   Neck:      Vascular: No carotid bruit or JVD.   Pulmonary:      Effort: Pulmonary effort is normal.      Breath sounds: Normal breath sounds. No wheezing. No rales.   Cardiovascular:      Normal rate. Regular rhythm.   Pulses:     Intact distal pulses.   Abdominal:      General: Bowel sounds are normal.      Palpations: Abdomen is soft.   Musculoskeletal:      Cervical back: Normal range of motion and neck supple. Skin:     General: Skin is warm and dry.      Findings: No rash.   Neurological:      Mental Status: Alert.       Procedures    Lab Review:         MDM    #1 coronary disease  Patient has nonobstructive disease in the past and is currently stable on medications with normal function  2.  Hyperlipidemia  Patient is on atorvastatin the lipid levels are well within normal limits    Patient's previous medical records, labs, and EKG were reviewed and discussed with the patient at today's visit.

## 2024-03-18 ENCOUNTER — OFFICE VISIT (OUTPATIENT)
Dept: FAMILY MEDICINE CLINIC | Facility: CLINIC | Age: 74
End: 2024-03-18
Payer: MEDICARE

## 2024-03-18 VITALS
HEART RATE: 59 BPM | WEIGHT: 133.4 LBS | HEIGHT: 66 IN | DIASTOLIC BLOOD PRESSURE: 84 MMHG | SYSTOLIC BLOOD PRESSURE: 123 MMHG | RESPIRATION RATE: 18 BRPM | BODY MASS INDEX: 21.44 KG/M2 | OXYGEN SATURATION: 98 %

## 2024-03-18 DIAGNOSIS — G20.B2 PARKINSON'S DISEASE WITH DYSKINESIA AND FLUCTUATING MANIFESTATIONS: ICD-10-CM

## 2024-03-18 DIAGNOSIS — Z00.00 MEDICARE ANNUAL WELLNESS VISIT, SUBSEQUENT: Primary | ICD-10-CM

## 2024-03-18 DIAGNOSIS — K21.9 GASTROESOPHAGEAL REFLUX DISEASE WITHOUT ESOPHAGITIS: ICD-10-CM

## 2024-03-18 DIAGNOSIS — Z12.11 COLON CANCER SCREENING: ICD-10-CM

## 2024-03-18 DIAGNOSIS — F41.1 GENERALIZED ANXIETY DISORDER: ICD-10-CM

## 2024-03-18 DIAGNOSIS — E78.5 HYPERLIPIDEMIA, UNSPECIFIED HYPERLIPIDEMIA TYPE: ICD-10-CM

## 2024-03-18 DIAGNOSIS — I71.21 ANEURYSM OF ASCENDING AORTA WITHOUT RUPTURE: ICD-10-CM

## 2024-03-18 RX ORDER — FAMOTIDINE 40 MG/1
40 TABLET, FILM COATED ORAL DAILY PRN
Qty: 30 TABLET | Refills: 1 | Status: SHIPPED | OUTPATIENT
Start: 2024-03-18

## 2024-03-18 NOTE — PROGRESS NOTES
The ABCs of the Annual Wellness Visit  Subsequent Medicare Wellness Visit    Subjective    Breanne Nicole is a 73 y.o. female who presents for a Subsequent Medicare Wellness Visit.    The following portions of the patient's history were reviewed and   updated as appropriate: allergies, current medications, past family history, past medical history, past social history, past surgical history, and problem list.    Compared to one year ago, the patient feels her physical   health is the same.    Compared to one year ago, the patient feels her mental   health is better.    Recent Hospitalizations:  She was not admitted to the hospital during the last year.     Current Medical Providers:  Patient Care Team:  Alvaro Avelar DO as PCP - General (Family Medicine)  Zaire Ohara MD as Consulting Physician (Cardiology)    Outpatient Medications Prior to Visit   Medication Sig Dispense Refill    atorvastatin (LIPITOR) 10 MG tablet TAKE ONE TABLET BY MOUTH DAILY 90 tablet 1    calcium carb-cholecalciferol 600-800 MG-UNIT tablet Take 1 tablet by mouth.      carbidopa-levodopa (SINEMET)  MG per tablet       Cyanocobalamin (VITAMIN B-12 PO) Take  by mouth.      escitalopram (LEXAPRO) 10 MG tablet Take 3 tablets by mouth Daily. She takes 30 mg all at night before bed      metroNIDAZOLE (METROGEL) 0.75 % gel APPLY THIN LAYER TOPICALLY TO THE AFFECTED AREA TWICE DAILY IN THE MORNING AND IN THE EVENING FOR ROSACEA      midodrine (PROAMATINE) 5 MG tablet Take 1 tablet by mouth Daily. 5mg in the am 10mg in the pm      polyethylene glycol (MIRALAX) packet Take 17 g by mouth. Has to take all the time or she isn't able to go/      VITAMIN D PO Take  by mouth.      famotidine (PEPCID) 40 MG tablet        No facility-administered medications prior to visit.       No opioid medication identified on active medication list. I have reviewed chart for other potential  high risk medication/s and harmful drug interactions in the  "elderly.        Aspirin is not on active medication list.  Aspirin use is not indicated based on review of current medical condition/s. Risk of harm outweighs potential benefits.  .    Patient Active Problem List   Diagnosis    Coronary artery disease involving native coronary artery without angina pectoris    Chest pain    Chest pressure    Heart murmur    Hyperlipidemia    Fatigue    Family history of coronary artery disease    Shortness of breath    Palpitations    Allergic state    Anemia    B-cell lymphoma    Constipation    Medicare annual wellness visit, subsequent    Generalized anxiety disorder    Irritable bowel syndrome    Osteoarthritis    Osteoporosis    Parkinson's disease    Rosacea    Anxiety about health    Dystonia    Tremor    Vitamin D deficiency     Advance Care Planning   Advance Care Planning     Advance Directive is on file.  ACP discussion was held with the patient during this visit. Patient has an advance directive in EMR which is still valid.      Objective    Vitals:    03/18/24 1253   BP: 123/84   Pulse: 59   Resp: 18   SpO2: 98%   Weight: 60.5 kg (133 lb 6.4 oz)   Height: 167.6 cm (66\")     Estimated body mass index is 21.53 kg/m² as calculated from the following:    Height as of this encounter: 167.6 cm (66\").    Weight as of this encounter: 60.5 kg (133 lb 6.4 oz).    BMI is within normal parameters. No other follow-up for BMI required.      Does the patient have evidence of cognitive impairment? No          HEALTH RISK ASSESSMENT    Smoking Status:  Social History     Tobacco Use   Smoking Status Never    Passive exposure: Never   Smokeless Tobacco Never     Alcohol Consumption:  Social History     Substance and Sexual Activity   Alcohol Use Yes    Comment: 3oz 3-4 days/week     Fall Risk Screen:    JEFFREYADI Fall Risk Assessment was completed, and patient is at HIGH risk for falls. Assessment completed on:3/18/2024    Depression Screening:      3/18/2024    12:57 PM   PHQ-2/PHQ-9 " Depression Screening   Little Interest or Pleasure in Doing Things 0-->not at all   Feeling Down, Depressed or Hopeless 0-->not at all   PHQ-9: Brief Depression Severity Measure Score 0       Health Habits and Functional and Cognitive Screening:      3/18/2024    12:58 PM   Functional & Cognitive Status   Do you have difficulty preparing food and eating? No   Do you have difficulty bathing yourself, getting dressed or grooming yourself? No   Do you have difficulty using the toilet? No   Do you have difficulty moving around from place to place? No   Do you have trouble with steps or getting out of a bed or a chair? No   Current Diet Well Balanced Diet   Dental Exam Up to date   Eye Exam Up to date   Exercise (times per week) 5 times per week   Current Exercises Include Other   Do you need help using the phone?  No   Are you deaf or do you have serious difficulty hearing?  No   Do you need help to go to places out of walking distance? No   Do you need help shopping? No   Do you need help preparing meals?  No   Do you need help with housework?  No   Do you need help with laundry? No   Do you need help taking your medications? No   Do you need help managing money? No   Do you ever drive or ride in a car without wearing a seat belt? No       Age-appropriate Screening Schedule:  Refer to the list below for future screening recommendations based on patient's age, sex and/or medical conditions. Orders for these recommended tests are listed in the plan section. The patient has been provided with a written plan.    Health Maintenance   Topic Date Due    TDAP/TD VACCINES (1 - Tdap) Never done    RSV Vaccine - Adults (1 - 1-dose 60+ series) Never done    ZOSTER VACCINE (2 of 2) 12/06/2018    COVID-19 Vaccine (5 - 2023-24 season) 09/01/2023    LIPID PANEL  07/25/2024    COLORECTAL CANCER SCREENING  12/16/2024    ANNUAL WELLNESS VISIT  03/18/2025    DXA SCAN  05/31/2025    MAMMOGRAM  02/20/2026    HEPATITIS C SCREENING  Completed  "   INFLUENZA VACCINE  Completed    Pneumococcal Vaccine 65+  Completed                CMS Preventative Services Quick Reference  Risk Factors Identified During Encounter  Immunizations Discussed/Encouraged: Shingrix and RSV (Respiratory Syncytial Virus)  The above risks/problems have been discussed with the patient.  Pertinent information has been shared with the patient in the After Visit Summary.  An After Visit Summary and PPPS were made available to the patient.    Follow Up:   Next Medicare Wellness visit to be scheduled in 1 year.       Additional E&M Note during same encounter follows:  Patient has multiple medical problems which are significant and separately identifiable that require additional work above and beyond the Medicare Wellness Visit.      Chief Complaint  Medicare Wellness-subsequent    Subjective        HPI  Mammogram in Nov 2023.  DEXA in Feb 2023.    Last colonoscopy in Dec 2014, due this year.    Sees [facility] for Parkinson's disease.      Hx of ascending aortic aneurysm, seen on CT in 2014.    Dr. Ohara recommended she has surveillance.      Objective   Vital Signs:  /84   Pulse 59   Resp 18   Ht 167.6 cm (66\")   Wt 60.5 kg (133 lb 6.4 oz)   SpO2 98%   BMI 21.53 kg/m²     GEN: In no acute distress, non toxic appearing  HEENT: Pupils equal and reactive to light, sclera clear. Mucous membranes moist. Oropharynx without erythema or exudate. No cervical or submandibular lymphadenopathy.  CV: Regular rate and rhythm, no murmurs, 2+ peripheral pulses, No extremity edema.   RESP: Lungs clear to auscultation anteriorly and posteriorly in all lung fields bilaterally.  NEURO: AAO to person, place, and time. CN 2-12 intact grossly.  Significant bilateral upper extremity tremor.                Assessment and Plan   Diagnoses and all orders for this visit:    1. Medicare annual wellness visit, subsequent (Primary)  Overall reassuring exam.  Blood pressure at goal today.  Continue " regular exercise.  Continue healthy diet with plenty of fruits and vegetables.  Blood work as below.  Mammogram November 2023.  DEXA February 2023, sees Dr. Whiting for osteopenia porosis management.  Colonoscopy will be due December 2024.  Next wellness in 1 year, follow-up sooner if needed.  -     CBC (No Diff); Future  -     Comprehensive Metabolic Panel; Future  -     Hemoglobin A1c; Future  -     Lipid Panel; Future  -     TSH; Future    2. Hyperlipidemia, unspecified hyperlipidemia type  Continue atorvastatin 10 mg daily, check lipids today.    3. Generalized anxiety disorder  Continue Lexapro 30 mg daily.    4. Parkinson's disease with dyskinesia and fluctuating manifestations  Continue carbidopa levodopa and follow-up with neurology.    5. Colon cancer screening  Discussed that she will be due in December of this year.    6. Gastroesophageal reflux disease without esophagitis  -     famotidine (PEPCID) 40 MG tablet; Take 1 tablet by mouth Daily As Needed for Heartburn.  Dispense: 30 tablet; Refill: 1    7. Aneurysm of ascending aorta without rupture  Reviewed previous imaging studies showing ascending aortic aneurysm.  In 2015 it was 3.7 cm.  Will obtain CTA chest for surveillance.  -     CT Angiogram Chest; Future      Follow Up   Return in about 1 year (around 3/18/2025) for Medicare Wellness.  Patient was given instructions and counseling regarding her condition or for health maintenance advice. Please see specific information pulled into the AVS if appropriate.

## 2024-03-20 ENCOUNTER — TELEPHONE (OUTPATIENT)
Dept: FAMILY MEDICINE CLINIC | Facility: CLINIC | Age: 74
End: 2024-03-20

## 2024-03-20 NOTE — TELEPHONE ENCOUNTER
Caller: Breanne Nicole A    Relationship: Self    Best call back number: [phone]     What is the best time to reach you: ANY    Who are you requesting to speak with (clinical staff, provider,  specific staff member): CLINICAL    Do you know the name of the person who called: BREANNE    What was the call regarding: PATIENT WOULD LIKE THE CT SCAN ORDERS SENT TO [facility]. PLEASE CALL ONCE IT IS SENT SO SHE CAN SCHEDULE.    Is it okay if the provider responds through [employer]: PLEASE CALL

## 2024-03-21 ENCOUNTER — TELEPHONE (OUTPATIENT)
Dept: FAMILY MEDICINE CLINIC | Facility: CLINIC | Age: 74
End: 2024-03-21
Payer: MEDICARE

## 2024-03-21 NOTE — TELEPHONE ENCOUNTER
Caller: Breanne Nicole A    Relationship: Self    Best call back number: [phone]     What orders are you requesting (i.e. lab or imaging): CT SCAN    Where will you receive your lab/imaging services: [facility]    Additional notes: PATIENT STATED THAT [facility] HAS HAD TROUBLE WITH THEIR FAX MACHINE AND IS REQUESTING FOR THE ORDER FOR THE CT SCAN TO BE RE-FAXED     PLEASE ADVISE

## 2024-03-22 NOTE — TELEPHONE ENCOUNTER
Caller: Breanne Nicole A    Relationship to patient: Self    Best call back number: [phone]    Patient is needing:     ASKING FOR THE CT ORDER TO BE RESENT AGAIN, THEY STILL DO NOT HAVE THE FAX.     ASKING FOR IT BE RESENT, OR TO TRY ANOTHER FAX NUMBER, OR THEY CAN TAKE A VERBAL.

## 2024-03-25 PROCEDURE — 80061 LIPID PANEL: CPT | Performed by: STUDENT IN AN ORGANIZED HEALTH CARE EDUCATION/TRAINING PROGRAM

## 2024-03-25 PROCEDURE — 83036 HEMOGLOBIN GLYCOSYLATED A1C: CPT | Performed by: STUDENT IN AN ORGANIZED HEALTH CARE EDUCATION/TRAINING PROGRAM

## 2024-03-25 PROCEDURE — 85027 COMPLETE CBC AUTOMATED: CPT | Performed by: STUDENT IN AN ORGANIZED HEALTH CARE EDUCATION/TRAINING PROGRAM

## 2024-03-25 PROCEDURE — 84443 ASSAY THYROID STIM HORMONE: CPT | Performed by: STUDENT IN AN ORGANIZED HEALTH CARE EDUCATION/TRAINING PROGRAM

## 2024-03-25 PROCEDURE — 80053 COMPREHEN METABOLIC PANEL: CPT | Performed by: STUDENT IN AN ORGANIZED HEALTH CARE EDUCATION/TRAINING PROGRAM

## 2024-04-04 ENCOUNTER — TELEPHONE (OUTPATIENT)
Dept: FAMILY MEDICINE CLINIC | Facility: CLINIC | Age: 74
End: 2024-04-04
Payer: MEDICARE

## 2024-04-04 RX ORDER — ATORVASTATIN CALCIUM 10 MG/1
10 TABLET, FILM COATED ORAL DAILY
Qty: 90 TABLET | Refills: 1 | Status: SHIPPED | OUTPATIENT
Start: 2024-04-04

## 2024-04-09 ENCOUNTER — TELEPHONE (OUTPATIENT)
Dept: FAMILY MEDICINE CLINIC | Facility: CLINIC | Age: 74
End: 2024-04-09
Payer: MEDICARE

## 2024-04-09 DIAGNOSIS — M54.50 CHRONIC BILATERAL LOW BACK PAIN WITHOUT SCIATICA: Primary | ICD-10-CM

## 2024-04-09 DIAGNOSIS — G89.29 CHRONIC BILATERAL LOW BACK PAIN WITHOUT SCIATICA: Primary | ICD-10-CM

## 2024-04-09 NOTE — TELEPHONE ENCOUNTER
Caller: Breanne Nicole    Relationship: Self    Best call back number:     Bonnie Breanne ARNOLD (Self) [phone] (Mobile)       What is the medical concern/diagnosis: BACK TIGHTNESS     What specialty or service is being requested: PHYSICAL THERAPY     What is the provider, practice or medical service name: [facility] / DR HUFFMAN     What is the office location: [address] [place] [phone]      What is the office phone number:     Any additional details:

## 2024-04-11 ENCOUNTER — TREATMENT (OUTPATIENT)
Dept: PHYSICAL THERAPY | Facility: CLINIC | Age: 74
End: 2024-04-11
Payer: MEDICARE

## 2024-04-11 DIAGNOSIS — R29.3 POOR POSTURE: ICD-10-CM

## 2024-04-11 DIAGNOSIS — M53.3 SI (SACROILIAC) JOINT DYSFUNCTION: ICD-10-CM

## 2024-04-11 DIAGNOSIS — M54.50 CHRONIC BILATERAL LOW BACK PAIN WITHOUT SCIATICA: Primary | ICD-10-CM

## 2024-04-11 DIAGNOSIS — R26.89 BALANCE PROBLEM: ICD-10-CM

## 2024-04-11 DIAGNOSIS — G89.29 CHRONIC BILATERAL LOW BACK PAIN WITHOUT SCIATICA: Primary | ICD-10-CM

## 2024-04-11 DIAGNOSIS — R26.2 DIFFICULTY WALKING: ICD-10-CM

## 2024-04-11 NOTE — PROGRESS NOTES
Physical Therapy Initial Evaluation and Plan of Care  [address] [address]    Patient: Breanne Nicole   : 1950  Diagnosis/ICD-10 Code:  Chronic bilateral low back pain without sciatica [M54.50, G89.29]  Referring practitioner: Alvaro Avelar DO  Date of Initial Visit: 2024  Today's Date: 2024  Patient seen for 1 sessions           Visit Diagnoses:     ICD-10-CM ICD-9-CM   1. Chronic bilateral low back pain without sciatica  M54.50 724.2    G89.29 338.29   2. SI (sacroiliac) joint dysfunction  M53.3 724.6   3. Poor posture  R29.3 781.92   4. Balance problem  R26.89 781.99   5. Difficulty walking  R26.2 719.7        Subjective Questionnaire: Oswestry: 24 = 48% limited    Subjective Evaluation    History of Present Illness  Mechanism of injury: Pt reports 3 wk hx of back pain L>R, but going across the back. Pt denies n/t, weakness or pain in the legs. Pt notes this is the 3rd time this happened to her and was aggravated with leaning over and pulling weeks ~3/15/24. Pt asked to be referred to a myofacial therapist and had a treatment yesterday. Pt states something popped and felt like it released a little with some increased mobility since. Pt states they are leaving for [place] in 2 weeks and wants to be able to tolerate all the travel without discomfort.     Pt has had a couple trip falls in the last year, none with injury.     Pt RMD as needed.     PMH: allergies, CAD native coronary artery, chest pain, heart murmur, hyperlipidemia, palpitations, Vit D deficiency, constipation, IBS, OA, OP, anxiety about health, ROMIE, anemia (pt unsure), hx B-cell lymphoma 2015, Parkinson's disease, dystonia, tremor, SOA, rosacea, chest pressure, fatigue, aneurysm aorta, LBP, scoliosis, tremor    PSH: colonoscopy, lymph node biopsy, adenoidectomy, breast surg removal of cyst, tonscillectomy    Denies hx: pacemaker, metal implants, CA, CVA, seizures, MI, DM, latex  allergies    Pain: 6/10 current, 4/10 at best, 10/10 at worst    Aggravating/functional factors: walking, tying shoes, rising, sitting kathy in straight chair, lifting her leg, standing, bending, twisting, squatting, lifting, carrying, picking items off the floor, pushing, pulling, stairs, in/out of car, house work/yard work (anything with bending)    PLOF: prior issues with the above functional activities    Relieving factors: Tylenol, hot showers, heating pad, ice pack    Social Hx: lives with spouse; stairs (1 rail; step-to or slow with reciprocal); retired; gardening, reading, traveling, be with grandchildren      Quality of life: good    Pain  Quality: dull ache  Progression: improved    Hand dominance: right    Treatments  Previous treatment: chiropractic and physical therapy  Current treatment: injection treatment  Current treatment comments: in feet for Parkinson's.   Patient Goals  Patient goals for therapy: decreased pain, improved balance, increased strength, independence with ADLs/IADLs, return to sport/leisure activities and increased motion  Patient goal: travel safely without pain         Objective          Active Range of Motion     Lumbar   Left rotation: WFL  Right rotation: WFL  Left Hip   Flexion: 85 degrees   External rotation (90/90): 20 degrees   Internal rotation (90/90): 20 degrees     Right Hip   Flexion: 100 degrees   External rotation (90/90): 20 degrees   Internal rotation (90/90): 30 degrees   Left Knee   Extensor la degrees     Right Knee   Extensor la degrees with pain    Additional Active Range of Motion Details  Mod reynoso SB B, Significant limitation flex, mod reynoso ext  Post knee pain B/tight    Strength/Myotome Testing     Left Hip   Planes of Motion   Flexion: 3- (based on ROM; able to hold mod resistance briefly)  External rotation: 4 (painful lat hip)    Right Hip   Planes of Motion   Flexion: 4  External rotation: 4 (painful lat hip)    Additional Strength Details  Grossly  4+ in available range LE myotomes, IV/EV, gt toe ext in sitting except as above        Observation: resting tremors noted B hands    Palpation: L ASIS elevated in supine; R iliac crest elevated in sitting; L PSIS moves with lumbar region with trunk flex; R on L/R on R sacrum    Sensation: intact/equal to LT B LEs    Posture: head fwd/rounded shoulders, asymmetric shoulders (L elevated), convexity L in mid thor    Gait: I without AD, reduced gt speed,     Balance: SLS 4 s B on level ground without UE support & SBA    Transfers:  I with sit to/from stand and supine to/from sit    Flexibility: tight hams, hip rotators, calves, ITB (did not test hip flexors or quads)    Special Tests:       Assessment & Plan       Assessment  Impairments: abnormal coordination, abnormal gait, abnormal muscle firing, abnormal muscle tone, abnormal or restricted ROM, activity intolerance, impaired balance, impaired physical strength, lacks appropriate home exercise program, pain with function, safety issue and weight-bearing intolerance   Assessment details: The patient is a 74 y.o. female who presents to physical therapy today for Chronic bilateral low back pain without sciatica. Upon initial evaluation, the patient demonstrates the above & following impairments: pain, reduced posture, SI/IS dysfunction, decreased ROM/flexibility, strength, gait, balance and function. Due to these impairments, the patient is unable to/limited with: walking, tying shoes, rising, sitting kathy in straight chair, lifting her leg, standing, bending, twisting, squatting, lifting, carrying, picking items off the floor, pushing, pulling, stairs, in/out of car, and house work/yard work (anything with bending). The patient would benefit from skilled PT services to address functional limitations and impairments and to improve patient quality of life.      Barriers to therapy: OA, parkinson's, CAD with hx palpitations/heart murmur/chest pressure, hx lymphoma 5 yrs  ago, OP, anxiety, aortic aneurysm, and scoliosis  could affect PT Rx/progress/outcomes if exacerbated/unregulated or recurs which could affect tolerance to PT/exs  Prognosis: good    Goals  Plan Goals: STGs in 4 weeks:  Decrease pain to 5-6/10 on average  Increase LE AROM by 5-10 degrees where limited as much  Increase L hip flexion strength to 3+/5    LTGs by discharge  Increase trunk/LE ROM to WFL/WNL with min/no pain  Increase LE strength to 4+/5 or better  Pt will be able to ascend/descend stairs reciprocally with or without use of rail(s) and with minimal difficulty or pain  Pt will be able to sit/drive/ride for 30-60 mins without difficulty or pain  Pt will be able to stand 30-60 mins for basic ADLs without difficulty or pain  Pt will be able to walk 30-60 mins for grocery shopping without difficulty, pain or LOB  Pt will be able to wash/dress/groom without difficulty or increased pain  Pt will be able to lift/carry laundry baskets, pots/pans, garbage or grocery bags without difficulty or increased pain      Plan  Therapy options: will be seen for skilled therapy services  Planned modality interventions: cryotherapy, thermotherapy (hydrocollator packs), electrical stimulation/Dutch stimulation, ultrasound, dry needling and TENS  Planned therapy interventions: manual therapy, neuromuscular re-education, postural training, soft tissue mobilization, spinal/joint mobilization, strengthening, stretching, therapeutic activities, transfer training, abdominal trunk stabilization, ADL retraining, body mechanics training, home exercise program, gait training, functional ROM exercises, flexibility, motor coordination training, balance/weight-bearing training and joint mobilization  Frequency: 3x week  Duration in weeks: 13  Treatment plan discussed with: patient        History # of Personal Factors and/or Comorbidities: HIGH (3+)  Examination of Body System(s): # of elements: HIGH (4+)  Clinical Presentation: UNSTABLE   fall hx, multi comorbidity, acute on chronic  Clinical Decision Making: HIGH      Timed:         Manual Therapy:        mins  05507;     Therapeutic Exercise:   12      mins  65048;     Neuromuscular Reggie:        mins  12795;    Therapeutic Activity:          mins  57263;     Gait Training:           mins  96436;     Ultrasound:          mins  74308;    Ionto                                   mins   51801  Self Care                            mins   87150      Un-Timed:  Electrical Stimulation:         mins  16866 ( );  Canalith Repos                   mins  21581  Dry Needle 1-2 ms      ___  mins 80790  Dry Needle  3+ ms              mins 57510  Traction          mins 40172  Low Eval          Mins  66879  Mod Eval          Mins  70275  High Eval                      38      Mins  82515  Re-Eval                               mins  35068        Timed Treatment:   12   mins   Total Treatment:     50   mins            PT SIGNATURE: Claudia Burgess PT   [place] PT Lic# [id number]  DATE TREATMENT INITIATED: 4/11/2024    Medicare Initial Certification  Certification Period: 4/11/2024 through 7/9/2024  I certify that the therapy services are furnished while this patient is under my care.  The services outlined above are required by this patient, and will be reviewed every 90 days.         Physician Signature: _________________________  PHYSICIAN: Alvaro Avelar DO   NPI: [id number]                                             DATE: _____________________________________    Please sign and return via fax to [phone]. Thank you, [facility].

## 2024-04-11 NOTE — PROGRESS NOTES
Physical Therapy Treatment Note  [address] [place] [place] [address]      Patient: Breanne Nicole   : 1950  Diagnosis/ICD-10 Code:  Chronic bilateral low back pain without sciatica [M54.50, G89.29]  Referring practitioner: Alvaro Avelar DO  Date of Initial Visit: Type: THERAPY  Noted: 2024  Today's Date: 2024  Patient seen for 1 sessions           Visit Diagnoses:     ICD-10-CM ICD-9-CM   1. Chronic bilateral low back pain without sciatica  M54.50 724.2    G89.29 338.29       Subjective Breanne Nicole reports:    Objective     See Exercise, Manual, and Modality Logs for complete treatment.     Patient Education: cues for therex    Assessment/Plan      {AMB PT PLAN (SOAP Note):89937}            Timed:         Manual Therapy:         mins  99822;     Therapeutic Exercise:         mins  41783;     Neuromuscular Reggie:        mins  98965;    Therapeutic Activity:          mins  45483;     Gait Training:           mins  90348;     Ultrasound:          mins  03958;    Ionto                                   mins   06352  Self Care                            mins   54803    Un-Timed:  Electrical Stimulation:         mins  28683 ( );  Traction          mins 14774  Canalith Repos                   mins  10298  Dry Needle 1-2 ms      ___  mins 52854  Dry Needle  3+ ms              mins 39083  Low Eval          mins  70512  Mod Eval          Mins  85954  High Eval                            Mins  50809  Re-Eval                               mins  24428    Timed Treatment:      mins   Total Treatment:     ***   mins          Claudia Burgess, PT    Physical Therapist

## 2024-04-15 ENCOUNTER — TREATMENT (OUTPATIENT)
Dept: PHYSICAL THERAPY | Facility: CLINIC | Age: 74
End: 2024-04-15
Payer: MEDICARE

## 2024-04-15 DIAGNOSIS — M53.3 SI (SACROILIAC) JOINT DYSFUNCTION: ICD-10-CM

## 2024-04-15 DIAGNOSIS — M54.50 CHRONIC BILATERAL LOW BACK PAIN WITHOUT SCIATICA: Primary | ICD-10-CM

## 2024-04-15 DIAGNOSIS — G89.29 CHRONIC BILATERAL LOW BACK PAIN WITHOUT SCIATICA: Primary | ICD-10-CM

## 2024-04-15 DIAGNOSIS — R29.3 POOR POSTURE: ICD-10-CM

## 2024-04-15 DIAGNOSIS — R26.2 DIFFICULTY WALKING: ICD-10-CM

## 2024-04-15 DIAGNOSIS — R26.89 BALANCE PROBLEM: ICD-10-CM

## 2024-04-15 PROCEDURE — 97112 NEUROMUSCULAR REEDUCATION: CPT | Performed by: PHYSICAL THERAPIST

## 2024-04-15 PROCEDURE — 97530 THERAPEUTIC ACTIVITIES: CPT | Performed by: PHYSICAL THERAPIST

## 2024-04-15 PROCEDURE — 97140 MANUAL THERAPY 1/> REGIONS: CPT | Performed by: PHYSICAL THERAPIST

## 2024-04-15 NOTE — PROGRESS NOTES
Physical Therapy Treatment Note  [address] [place] [address]      Patient: Breanne Nicole   : 1950  Diagnosis/ICD-10 Code:  Chronic bilateral low back pain without sciatica [M54.50, G89.29]  Referring practitioner: Alvaro Avelar DO  Date of Initial Visit: Type: THERAPY  Noted: 2024  Today's Date: 4/15/2024  Patient seen for 2 sessions           Visit Diagnoses:     ICD-10-CM ICD-9-CM   1. Chronic bilateral low back pain without sciatica  M54.50 724.2    G89.29 338.29   2. SI (sacroiliac) joint dysfunction  M53.3 724.6   3. Poor posture  R29.3 781.92   4. Balance problem  R26.89 781.99   5. Difficulty walking  R26.2 719.7       Subjective Breanne Nicole reports: she's done exs 2x the other day, but if she sits for any amount of time, she gets really stiff and can barely move. Pt worries that she won't  be able to tolerate sitting in the airplane for 12 hours.     Objective   Observation: Pt can not control descent with stand to sit and drops down    See Exercise, Manual, and Modality Logs for complete treatment.     Patient Education: cues for therex/NMR/theracts for form, reps, holds, and limiting ROM prn; discussed possibly using a w/c for travel for safety and more speed of movement for pt; discussed using hands for sit to/from stand to try to control descent and suggested a walker or cane for stability upon rising and for safety    Assessment/Plan  Pt tolerated session fairly well with progressions per flow sheet. Added manual with improved sacral alignment and reduced stiffness with rising. Pt demonstrated slight improvement with transfers sit to stand after ed, but has difficulty controlling sitting unless the plinth is more elevated.     Progress per Plan of Care and Progress strengthening /stabilization /functional activity            Timed:         Manual Therapy:   13      mins  46906;     Therapeutic Exercise:    6     mins  30590;     Neuromuscular  Reggie:  8      mins  57886;    Therapeutic Activity:   11     mins  68123;     Gait Training:           mins  47817;     Ultrasound:          mins  72935;    Ionto                                   mins   49088  Self Care                            mins   65729    Un-Timed:  Electrical Stimulation:         mins  18439 ( );  Traction          mins 15768  Canalith Repos                   mins  12515  Dry Needle 1-2 ms      ___  mins 24411  Dry Needle  3+ ms              mins 73160  Low Eval          mins  41302  Mod Eval          Mins  91490  High Eval                            Mins  47474  Re-Eval                               mins  57241    Timed Treatment:   38   mins   Total Treatment:     38   mins          Claudia Burgess, PT    Physical Therapist

## 2024-04-17 ENCOUNTER — TREATMENT (OUTPATIENT)
Dept: PHYSICAL THERAPY | Facility: CLINIC | Age: 74
End: 2024-04-17
Payer: MEDICARE

## 2024-04-17 DIAGNOSIS — G89.29 CHRONIC BILATERAL LOW BACK PAIN WITHOUT SCIATICA: Primary | ICD-10-CM

## 2024-04-17 DIAGNOSIS — M53.3 SI (SACROILIAC) JOINT DYSFUNCTION: ICD-10-CM

## 2024-04-17 DIAGNOSIS — M54.50 CHRONIC BILATERAL LOW BACK PAIN WITHOUT SCIATICA: Primary | ICD-10-CM

## 2024-04-17 DIAGNOSIS — R26.89 BALANCE PROBLEM: ICD-10-CM

## 2024-04-17 DIAGNOSIS — R29.3 POOR POSTURE: ICD-10-CM

## 2024-04-17 DIAGNOSIS — R26.2 DIFFICULTY WALKING: ICD-10-CM

## 2024-04-17 NOTE — PROGRESS NOTES
Physical Therapy Treatment Note  [address] [facility] [place] [address]      Patient: Breanne Nicole   : 1950  Diagnosis/ICD-10 Code:  Chronic bilateral low back pain without sciatica [M54.50, G89.29]  Referring practitioner: Alvaro Avelra DO  Date of Initial Visit: Type: THERAPY  Noted: 2024  Today's Date: 2024  Patient seen for 3 sessions           Visit Diagnoses:     ICD-10-CM ICD-9-CM   1. Chronic bilateral low back pain without sciatica  M54.50 724.2    G89.29 338.29   2. SI (sacroiliac) joint dysfunction  M53.3 724.6   3. Poor posture  R29.3 781.92   4. Balance problem  R26.89 781.99   5. Difficulty walking  R26.2 719.7       Subjective Breanne Nicole reports: she would like to know if DN will help her and would like some tips for the plane ride to [place]. Pt notes she was told to get a sacral Wed-g. Pt remembers when she fell in the kitchen and     Objective   Quantitative points = 0     See Exercise, Manual, and Modality Logs for complete treatment.     Patient Education: cues for therex; discussed that pt would be a good candidate for DN    Assessment/Plan Pt would be an ideal candidate for DN based on the Quantitative points. Pt with improved pelvic alignment after manual therapy with some reduced pain noted with leaving the clinic.       Progress per Plan of Care and Progress strengthening /stabilization /functional activity            Timed:         Manual Therapy:    11     mins  17206;     Therapeutic Exercise:    17    mins  16347;     Neuromuscular Reggie:        mins  05809;    Therapeutic Activity:          mins  91921;     Gait Training:           mins  34658;     Ultrasound:          mins  84692;    Ionto                                   mins   06187  Self Care                            mins   04026    Un-Timed:  Electrical Stimulation:         mins  90106 ( );  Traction          mins 51369  Canalith Repos                   mins  48780  Dry  Needle 1-2 ms      ___  mins 70331  Dry Needle  3+ ms              mins 20561  Low Eval          mins  14555  Mod Eval          Mins  16949  High Eval                            Mins  21386  Re-Eval                               mins  67931    Timed Treatment:  28    mins   Total Treatment:    28    mins          Claudia Burgess, PT    Physical Therapist

## 2024-04-19 ENCOUNTER — TREATMENT (OUTPATIENT)
Dept: PHYSICAL THERAPY | Facility: CLINIC | Age: 74
End: 2024-04-19
Payer: MEDICARE

## 2024-04-19 DIAGNOSIS — G89.29 CHRONIC BILATERAL LOW BACK PAIN WITHOUT SCIATICA: Primary | ICD-10-CM

## 2024-04-19 DIAGNOSIS — M54.50 CHRONIC BILATERAL LOW BACK PAIN WITHOUT SCIATICA: Primary | ICD-10-CM

## 2024-04-19 DIAGNOSIS — M53.3 SI (SACROILIAC) JOINT DYSFUNCTION: ICD-10-CM

## 2024-04-19 DIAGNOSIS — R26.2 DIFFICULTY WALKING: ICD-10-CM

## 2024-04-19 DIAGNOSIS — R26.89 BALANCE PROBLEM: ICD-10-CM

## 2024-04-19 DIAGNOSIS — R29.3 POOR POSTURE: ICD-10-CM

## 2024-04-19 PROCEDURE — 97110 THERAPEUTIC EXERCISES: CPT | Performed by: PHYSICAL THERAPIST

## 2024-04-19 PROCEDURE — 20561 NDL INSJ W/O NJX 3+ MUSC: CPT | Performed by: PHYSICAL THERAPIST

## 2024-04-19 PROCEDURE — 97140 MANUAL THERAPY 1/> REGIONS: CPT | Performed by: PHYSICAL THERAPIST

## 2024-04-19 NOTE — PROGRESS NOTES
Physical Therapy Treatment Note  [address] [place] [address]      Patient: Breanne Nicole   : 1950  Diagnosis/ICD-10 Code:  Chronic bilateral low back pain without sciatica [M54.50, G89.29]  Referring practitioner: Alvaro Avelar DO  Date of Initial Visit: Type: THERAPY  Noted: 2024  Today's Date: 2024  Patient seen for 4 sessions           Visit Diagnoses:     ICD-10-CM ICD-9-CM   1. Chronic bilateral low back pain without sciatica  M54.50 724.2    G89.29 338.29   2. SI (sacroiliac) joint dysfunction  M53.3 724.6   3. Poor posture  R29.3 781.92   4. Balance problem  R26.89 781.99   5. Difficulty walking  R26.2 719.7       Subjective Breanne Nicole reports: she feels like the dystonia is what's causing pulling in the low back. Pt notes she's felt better since last session. Pt has some pain this morning in the low back.     Pain: 2/10    Objective   Observation: transitional movements and gait appear more fluid, less difficult and     See Exercise, Manual, and Modality Logs for complete treatment.     Patient Education: cues for therex/NMR; reviewed DN theory, risks, side effects & benefits, as well as aftercare    Assessment/Plan After discussion of risks, side effects, benefits, and aftercare with DN, pt agreed with having it performed. Pt tolerated DN well and reported reduced pain afterwards with only some mild aching deep in the sacral region L.     Progress per Plan of Care and Progress strengthening /stabilization /functional activity            Timed:         Manual Therapy:   8      mins  01192;     Therapeutic Exercise:    9     mins  68680;     Neuromuscular Reggie:   6     mins  37116;    Therapeutic Activity:          mins  33601;     Gait Training:           mins  05076;     Ultrasound:          mins  40972;    Ionto                                   mins   86107  Self Care                            mins   83479    Un-Timed:  Electrical  Stimulation:         mins  91709 ( );  Traction          mins 83487  Canalith Repos                    mins  45254  Dry Needle 1-2 ms      ___  mins 08848  Dry Needle  3+ ms        12   mins 35293  Low Eval          mins  52431  Mod Eval          Mins  02765  High Eval                            Mins  23738  Re-Eval                               mins  77667    Timed Treatment:  23    mins   Total Treatment:     35   mins          Claudia Burgess, PT    Physical Therapist

## 2024-04-22 ENCOUNTER — TREATMENT (OUTPATIENT)
Dept: PHYSICAL THERAPY | Facility: CLINIC | Age: 74
End: 2024-04-22
Payer: MEDICARE

## 2024-04-22 DIAGNOSIS — M53.3 SI (SACROILIAC) JOINT DYSFUNCTION: ICD-10-CM

## 2024-04-22 DIAGNOSIS — R26.89 BALANCE PROBLEM: ICD-10-CM

## 2024-04-22 DIAGNOSIS — R29.3 POOR POSTURE: ICD-10-CM

## 2024-04-22 DIAGNOSIS — M54.50 CHRONIC BILATERAL LOW BACK PAIN WITHOUT SCIATICA: Primary | ICD-10-CM

## 2024-04-22 DIAGNOSIS — G89.29 CHRONIC BILATERAL LOW BACK PAIN WITHOUT SCIATICA: Primary | ICD-10-CM

## 2024-04-22 DIAGNOSIS — R26.2 DIFFICULTY WALKING: ICD-10-CM

## 2024-04-22 PROCEDURE — 97140 MANUAL THERAPY 1/> REGIONS: CPT | Performed by: PHYSICAL THERAPIST

## 2024-04-22 PROCEDURE — 20561 NDL INSJ W/O NJX 3+ MUSC: CPT | Performed by: PHYSICAL THERAPIST

## 2024-04-22 PROCEDURE — 97110 THERAPEUTIC EXERCISES: CPT | Performed by: PHYSICAL THERAPIST

## 2024-04-22 NOTE — PROGRESS NOTES
Physical Therapy Treatment Note  [address] [place] [address]      Patient: Breanne Nicole   : 1950  Diagnosis/ICD-10 Code:  Chronic bilateral low back pain without sciatica [M54.50, G89.29]  Referring practitioner: Alvaro Avelar DO  Date of Initial Visit: No linked episodes  Today's Date: 2024  Patient seen for Visit count could not be calculated. Make sure you are using a visit which is associated with an episode. sessions           Visit Diagnoses:     ICD-10-CM ICD-9-CM   1. Chronic bilateral low back pain without sciatica  M54.50 724.2    G89.29 338.29   2. SI (sacroiliac) joint dysfunction  M53.3 724.6   3. Poor posture  R29.3 781.92   4. Balance problem  R26.89 781.99   5. Difficulty walking  R26.2 719.7       Subjective Breanne Nicole reports: no pain at present. Pt still gets a little across the low back and a little bit at the L glute/piriformis region. Pt with some continued difficulty with rising from sitting, going up stairs, sitting in the car then getting out of the car. Pt states when she feels the hip tighten up she can do a couple of exs and it feels better right quickly.     Objective   Palpation: TTP B lumb PV L2-3 & L glute med/min    See Exercise, Manual, and Modality Logs for complete treatment.     Patient Education: cues for therex; reviewed use of towel roll behind back in lumb region and/or vertical thor region as well for comfort in airplane seat and with exs/NMR    Assessment/Plan Pt decided to do DN again today as she felt some residual soreness and tightness in the muscles. Pt with increased hypertonus R>L L2-4 as well. Added to DN. Pt tolerated well and appeared with reduced hypertonus and reporting decreased stiffness. Pt was able to transfer supine to sit with greater ease. Pt did not have her cane today and states it makes her more unsteady and that she does better just holding onto her spouse's arm. Performed exs in sitting today  to prepare for what pt can do during her flights.       Progress per Plan of Care and Progress strengthening /stabilization /functional activity            Timed:         Manual Therapy:   10      mins  13840;     Therapeutic Exercise:   20      mins  80385;     Neuromuscular Reggie:        mins  20989;    Therapeutic Activity:          mins  81973;     Gait Training:           mins  28355;     Ultrasound:          mins  45961;    Ionto                                   mins   70381  Self Care                            mins   18808    Un-Timed:  Electrical Stimulation:         mins  33557 ( );  Traction          mins 31181  Canalith Repos                   mins  64504  Dry Needle 1-2 ms      ___  mins 48296  Dry Needle  3+ ms        14   mins 63032  Low Eval          mins  10750  Mod Eval          Mins  51310  High Eval                            Mins  97428  Re-Eval                               mins  83097    Timed Treatment:  30   mins   Total Treatment:    44    mins          Claudia Burgess, PT    Physical Therapist

## 2024-05-29 ENCOUNTER — DOCUMENTATION (OUTPATIENT)
Dept: PHYSICAL THERAPY | Facility: CLINIC | Age: 74
End: 2024-05-29
Payer: MEDICARE

## 2024-05-29 NOTE — PROGRESS NOTES
Physical Therapy Discharge Summary  [address] [place] [address]    Patient: Breanne Nicole   : 1950  Diagnosis/ICD-10 Code:  Chronic bilateral low back pain without sciatica [M54.50, G89.29]  Referring practitioner: Alvaro Avelar DO  Date of Initial Visit: No linked episodes  Last Visit Date: 2024  Visits: 5    Discharge Status of Patient: pt canceled all future visits on 24 noting she was doing better.      Goals: Goal status is undetermined as pt never returned to PT after the 5th visit     Discharge Plan: Pt did not return to PT so no formal D/C instructions given. It is assumed that pt would Continue with current home exercise program as instructed     Comments: Pt was given HEP during sessions.      Date of Discharge: 24            Claudia Burgess, PT  Physical Therapist  [place] License: [id number]

## 2024-06-11 ENCOUNTER — PATIENT MESSAGE (OUTPATIENT)
Dept: FAMILY MEDICINE CLINIC | Facility: CLINIC | Age: 74
End: 2024-06-11
Payer: MEDICARE

## 2024-06-11 DIAGNOSIS — I71.21 ANEURYSM OF ASCENDING AORTA WITHOUT RUPTURE: Primary | ICD-10-CM

## 2024-08-28 RX ORDER — ATORVASTATIN CALCIUM 10 MG/1
10 TABLET, FILM COATED ORAL DAILY
Qty: 90 TABLET | Refills: 1 | Status: SHIPPED | OUTPATIENT
Start: 2024-08-28

## 2024-09-04 ENCOUNTER — OFFICE VISIT (OUTPATIENT)
Dept: CARDIAC SURGERY | Facility: CLINIC | Age: 74
End: 2024-09-04
Payer: MEDICARE

## 2024-09-04 VITALS
WEIGHT: 129 LBS | SYSTOLIC BLOOD PRESSURE: 118 MMHG | BODY MASS INDEX: 20.73 KG/M2 | HEIGHT: 66 IN | DIASTOLIC BLOOD PRESSURE: 80 MMHG | OXYGEN SATURATION: 97 % | HEART RATE: 70 BPM | TEMPERATURE: 97.8 F | RESPIRATION RATE: 18 BRPM

## 2024-09-04 DIAGNOSIS — I77.810 ASCENDING AORTA DILATATION: Primary | ICD-10-CM

## 2024-09-04 DIAGNOSIS — E78.5 HYPERLIPIDEMIA, UNSPECIFIED HYPERLIPIDEMIA TYPE: ICD-10-CM

## 2024-09-04 DIAGNOSIS — I71.21 ANEURYSM OF ASCENDING AORTA WITHOUT RUPTURE: Primary | ICD-10-CM

## 2024-09-04 DIAGNOSIS — I71.21 ANEURYSM OF ASCENDING AORTA WITHOUT RUPTURE: ICD-10-CM

## 2024-09-04 NOTE — PROGRESS NOTES
9/4/2024      Subjective:      Alvaro Avelar DO    Chief Complaint: Evaluation of ascending aortic dilatation    History of Present Illness:       Dear Alvaro Moss DO and Colleagues,    It was nice to see Breanne Nicole in [facility] today. She is a 74 y.o. female with Parkinson disease, anxiety/ depression, dystonia, hx diffuse large B-cell lymphoma, nonobstructive CAD, and HLD.  She reports that she first heard about her aorta being dilated in 2014 when she was being worked up for her lymphoma.  It was mentioned at that time but the primary focus was her cancer treatment.  She was seen by Dr. Ohara in February 2024 and had her Medicare wellness check in March 2024.  Her PCP ordered a CTA to evaluate her aorta.  She denies any change in her health history since last seeing her PCP.  She comes in today for evaluation and management of the ascending aortic dilatation.  The CTA of chest on 5/30/2024 was reviewed.  The aortic root measures 3.8 cm, ascending aorta measures 4.2 cm, and DTA measures 2.2 cm.  I found a PET scan report from 2015 that showed her ascending aorta was 4 cm.  I do not have the images to measure myself.  Her last transthoracic echo was in November 2019 which showed her EF 55 to 60%, aortic valve with sclerosis, mild MR, and mild TR.  She denies shortness of breath, chest/back pain, numbness/tingling/pain of extremities.  No vascular deficiencies or hyperextensible or hypermobile joints are noted on exam.  The patient's family history is negative for aneurysms or dissections, negative for connective tissue disease, and positive for coronary disease in first degree family members.  She reports that both her parents and sister had CAD in her early 50s.  Her BP today is 118/80. She is with her  for the appt today.        Patient Active Problem List   Diagnosis    Coronary artery disease involving native coronary artery without angina pectoris    Chest pain    Chest  pressure    Heart murmur    Hyperlipidemia    Fatigue    Family history of coronary artery disease    Shortness of breath    Palpitations    Allergic state    Anemia    B-cell lymphoma    Constipation    Medicare annual wellness visit, subsequent    Generalized anxiety disorder    Irritable bowel syndrome    Osteoarthritis    Osteoporosis    Parkinson's disease    Rosacea    Anxiety about health    Dystonia    Tremor    Vitamin D deficiency       Past Medical History:   Diagnosis Date    Aneurysm 2015 CT scan during CA treat    Anxiety Better    Quit taking meds    Arthritis     Cancer     Cancer free 5 years    Coronary artery disease     GERD (gastroesophageal reflux disease)     Head of bed raised and pepcid ac    Heart murmur     Hyperlipidemia     Irritable bowel syndrome chronic constipation    Low back pain right, lower scapula    Osteopenia     ?    Parkinson's disease     Scoliosis has progressed    Tremor     PD       Past Surgical History:   Procedure Laterality Date    ADENOIDECTOMY  1957    BREAST SURGERY  1971    Cystsk.    COLONOSCOPY  2017    LYMPH NODE BIOPSY  2015    TONSILLECTOMY  1957       Allergies   Allergen Reactions    Codeine Nausea Only    Penicillins Diarrhea and Nausea And Vomiting         Current Outpatient Medications:     atorvastatin (LIPITOR) 10 MG tablet, TAKE 1 TABLET BY MOUTH DAILY, Disp: 90 tablet, Rfl: 1    calcium carb-cholecalciferol 600-800 MG-UNIT tablet, Take 1 tablet by mouth., Disp: , Rfl:     carbidopa-levodopa (SINEMET)  MG per tablet, , Disp: , Rfl:     Cyanocobalamin (VITAMIN B-12 PO), Take  by mouth., Disp: , Rfl:     escitalopram (LEXAPRO) 10 MG tablet, Take 3 tablets by mouth Daily. She takes 30 mg all at night before bed, Disp: , Rfl:     famotidine (PEPCID) 40 MG tablet, Take 1 tablet by mouth Daily As Needed for Heartburn., Disp: 30 tablet, Rfl: 1    metroNIDAZOLE (METROGEL) 0.75 % gel, APPLY THIN LAYER TOPICALLY TO THE AFFECTED AREA TWICE DAILY IN THE  MORNING AND IN THE EVENING FOR ROSACEA, Disp: , Rfl:     midodrine (PROAMATINE) 5 MG tablet, Take 1 tablet by mouth Daily. 5mg in the am 10mg in the pm, Disp: , Rfl:     polyethylene glycol (MIRALAX) packet, Take 17 g by mouth. Has to take all the time or she isn't able to go/, Disp: , Rfl:     VITAMIN D PO, Take  by mouth., Disp: , Rfl:     Social History     Socioeconomic History    Marital status:    Tobacco Use    Smoking status: Never     Passive exposure: Never    Smokeless tobacco: Never   Vaping Use    Vaping status: Never Used   Substance and Sexual Activity    Alcohol use: Yes     Comment: 3oz 3-4 days/week    Drug use: Never    Sexual activity: Yes     Partners: Male     Birth control/protection: Post-menopausal       Family History   Problem Relation Age of Onset    Heart disease Mother         CHF, heart attacks    Hyperlipidemia Mother     Heart disease Father         Heart desease    Heart attack Father     Heart disease Sister         Heart attack age58    Heart attack Sister     Hearing loss Sister     Heart attack Sister     Heart disease Sister         age 51    Hearing loss Sister     Heart attack Sister     Anxiety disorder Son     Depression Son     Diabetes Brother     Hearing loss Brother     Hyperlipidemia Brother     Hypertension Brother     Hearing loss Brother     Hyperlipidemia Brother     Mental illness Paternal Aunt            Review of Systems:  Review of Systems   Respiratory:  Negative for shortness of breath.    Cardiovascular:  Negative for chest pain, palpitations and leg swelling.   Gastrointestinal:  Negative for abdominal pain.   Neurological:  Positive for dizziness and tremors.   Psychiatric/Behavioral:  The patient is nervous/anxious.        Physical Exam:    Vital Signs:      There is no height or weight on file to calculate BMI.                Physical Exam  Vitals and nursing note reviewed.   Constitutional:       General: She is awake.      Appearance: Normal  appearance. She is well-developed and well-groomed.      Comments:  with her for the appointment   HENT:      Head: Normocephalic and atraumatic.      Nose: Nose normal.      Mouth/Throat:      Lips: Pink.      Mouth: Mucous membranes are moist.      Pharynx: Uvula midline.   Eyes:      General: Lids are normal. No scleral icterus.     Extraocular Movements: Extraocular movements intact.      Conjunctiva/sclera: Conjunctivae normal.      Pupils: Pupils are equal, round, and reactive to light.   Neck:      Thyroid: No thyroid mass or thyromegaly.      Vascular: Normal carotid pulses. No carotid bruit, hepatojugular reflux or JVD.      Trachea: Trachea normal.   Cardiovascular:      Rate and Rhythm: Normal rate and regular rhythm.      Pulses:           Carotid pulses are 2+ on the right side and 2+ on the left side.       Radial pulses are 2+ on the right side and 2+ on the left side.        Femoral pulses are 2+ on the right side and 2+ on the left side.       Popliteal pulses are 2+ on the right side and 2+ on the left side.        Dorsalis pedis pulses are 2+ on the right side and 2+ on the left side.        Posterior tibial pulses are 2+ on the right side and 2+ on the left side.      Heart sounds: Normal heart sounds. No murmur heard.  Pulmonary:      Effort: Pulmonary effort is normal.      Breath sounds: Normal breath sounds.   Abdominal:      General: Bowel sounds are normal. There is no distension.      Palpations: Abdomen is soft.      Tenderness: There is no abdominal tenderness.   Musculoskeletal:      Cervical back: Neck supple.      Comments: Gait steady without use of assistive devices today   Lymphadenopathy:      Cervical: No cervical adenopathy.      Upper Body:      Right upper body: No supraclavicular adenopathy.      Left upper body: No supraclavicular adenopathy.   Skin:     General: Skin is warm and dry.      Capillary Refill: Capillary refill takes less than 2 seconds.      Findings: No  erythema or rash.      Nails: There is no clubbing.   Neurological:      Mental Status: She is alert and oriented to person, place, and time.      GCS: GCS eye subscore is 4. GCS verbal subscore is 5. GCS motor subscore is 6.      Motor: Tremor present.      Gait: Gait is intact.   Psychiatric:         Attention and Perception: Attention and perception normal.         Mood and Affect: Mood and affect normal.         Speech: Speech normal.         Behavior: Behavior normal. Behavior is cooperative.         Thought Content: Thought content normal.         Cognition and Memory: Cognition and memory normal.         Judgment: Judgment normal.          Assessment:     Ascending aortic dilatation, 4.2 cm  Parkinson disease--followed by [facility]  HLD--statin  Hx diffuse large B-cell lymphoma--in remission  Anxiety    Recommendation/Plan:       Continued risk factor modification of hypertension with a goal blood pressure less than 130/80, hyperlipidemia optimization, and continued avoidance of tobacco/nicotine products.    We discussed the importance of avoidance of over the counter decongestants and stimulants, specifically pseudoephedrine, for hypertension and aneurysm management.    Initiation of low aerobic exercise is indicated to help reduce body habitus, assist with blood pressure and cholesterol control.       Although risk of rupture is low, emergency department presentation is warranted for acute chest, back, or abdominal pain, syncope, or stroke like symptoms.    Follow up in [facility] in 1 year(s) with CT scan of chest without contrast.  By my measurements her ascending aorta is 4.2.  The dictated report measurement is 4.5 cm.  Likely she has had a dilated aorta for many years going back to her PET scans prior to her cancer treatment.  She does not have issues with HTN.  She tells me that she is potentially looking into alternative treatments for her Parkinson's disease.  There would not be a  contraindication to any procedures/surgery from a thoracic aortic perspective.    I spent approximately 50 minutes total in evaluating the data, examining the patient, discussing the options and counseling.  Independent interpretation of imaging.  Imaging shown to pt/.     Thank you for allowing us to participate in her care.    Regards,    Maggie Sanchez, APRN      **All problems and history are new to this examiner.  Extensive review of records prior to and during patient visit

## 2024-09-06 ENCOUNTER — PATIENT ROUNDING (BHMG ONLY) (OUTPATIENT)
Dept: CARDIAC SURGERY | Facility: CLINIC | Age: 74
End: 2024-09-06
Payer: MEDICARE

## 2024-09-06 PROBLEM — I77.810 ASCENDING AORTA DILATATION: Status: ACTIVE | Noted: 2024-09-06

## 2024-09-06 NOTE — PROGRESS NOTES
A My Chart message has been sent to the patient for PATIENT ROUNDING with [facility]

## 2024-10-16 ENCOUNTER — OFFICE VISIT (OUTPATIENT)
Dept: FAMILY MEDICINE CLINIC | Facility: CLINIC | Age: 74
End: 2024-10-16
Payer: MEDICARE

## 2024-10-16 VITALS
SYSTOLIC BLOOD PRESSURE: 120 MMHG | WEIGHT: 138 LBS | HEART RATE: 68 BPM | BODY MASS INDEX: 22.18 KG/M2 | OXYGEN SATURATION: 98 % | RESPIRATION RATE: 16 BRPM | DIASTOLIC BLOOD PRESSURE: 70 MMHG | HEIGHT: 66 IN

## 2024-10-16 DIAGNOSIS — R74.8 ELEVATED LIVER ENZYMES: ICD-10-CM

## 2024-10-16 DIAGNOSIS — R51.9 ACUTE NONINTRACTABLE HEADACHE, UNSPECIFIED HEADACHE TYPE: Primary | ICD-10-CM

## 2024-10-16 DIAGNOSIS — R50.9 FEVER, UNSPECIFIED FEVER CAUSE: ICD-10-CM

## 2024-10-16 PROCEDURE — 99214 OFFICE O/P EST MOD 30 MIN: CPT | Performed by: STUDENT IN AN ORGANIZED HEALTH CARE EDUCATION/TRAINING PROGRAM

## 2024-10-16 PROCEDURE — 1126F AMNT PAIN NOTED NONE PRSNT: CPT | Performed by: STUDENT IN AN ORGANIZED HEALTH CARE EDUCATION/TRAINING PROGRAM

## 2024-10-16 RX ORDER — AMANTADINE HYDROCHLORIDE 100 MG/1
100 CAPSULE, GELATIN COATED ORAL 3 TIMES DAILY
COMMUNITY

## 2024-10-16 NOTE — PROGRESS NOTES
"Chief Complaint  Chief Complaint   Patient presents with    Fever    Headache     Fever and headache started intermittently last Thursday.  Patient went to [facility] in [facility] yesterday morning and was tested for flu and covid but were negative.    Altered Mental Status     Patient states she went to the ED yesterday because she didn't feel like she could get her body moving correctly, was shuffling her feet, and was very foggy     Subjective        Breanne Nicole is a 74 y.o. female who presents to [facility].    History of Present Illness  Here for acute visit.  Seen in ER yesterday in [place].  Tested for covid / flu / rsv - negative for all.  XR w/o evidence of any acute findings.  UA w/o evidence of infection.    CBC w/o leukocytosis.  She was given rocephin, azithromycin and IVF's.    Her liver enzymes were elevated so they did a RUQ US which showed sludge but no acute issues.    The headache is temporal,  who is with her and helps provide history says she has also complained about it hurting behind her eyes.  She is drinking plenty of fluids.  She feels some better today.    Objective   /70   Pulse 68   Resp 16   Ht 167.6 cm (66\")   Wt 62.6 kg (138 lb)   SpO2 98%   BMI 22.27 kg/m²     Estimated body mass index is 22.27 kg/m² as calculated from the following:    Height as of this encounter: 167.6 cm (66\").    Weight as of this encounter: 62.6 kg (138 lb).     Physical Exam   GEN: In no acute distress, non toxic appearing  HEENT: Pupils equal and reactive to light, sclera clear. Mucous membranes moist. Oropharynx without erythema or exudate. No cervical or submandibular lymphadenopathy.  Fluid behind bilateral TMs but no erythema or purulence.  No facial tenderness to palpation.  CV: Regular rate and rhythm, no murmurs, 2+ peripheral pulses, No extremity edema.   RESP: Lungs clear to auscultation anteriorly and posteriorly in all " lung fields bilaterally.    Result Review :              Assessment and Plan     Diagnoses and all orders for this visit:    1. Acute nonintractable headache, unspecified headache type (Primary)  2. Fever, unspecified fever cause  Overall reassuring exam today.  Agree with ER visit, suspect likely viral.  Reviewed workup in the ER.    Could potentially be sinus related given her temporal/frontal location of the headache as well as fluid behind bilateral TMs and postnasal drip.  Recommended daily Flonase or Nasacort.  Continue to drink plenty of fluids.  Update me if any changes.    3. Elevated liver enzymes  Liver enzymes elevated at ER visit yesterday, recheck in 1 week.  Could be related to viral syndrome, reassuring right upper quadrant ultrasound.  Drink plenty of fluids as above.  -     Comprehensive Metabolic Panel; Future       Follow Up   As needed

## 2024-10-22 ENCOUNTER — LAB (OUTPATIENT)
Dept: FAMILY MEDICINE CLINIC | Facility: CLINIC | Age: 74
End: 2024-10-22
Payer: MEDICARE

## 2024-10-22 DIAGNOSIS — R74.8 ELEVATED LIVER ENZYMES: ICD-10-CM

## 2024-10-22 LAB
ALBUMIN SERPL-MCNC: 3.7 G/DL (ref 3.5–5.2)
ALBUMIN/GLOB SERPL: 1.2 G/DL
ALP SERPL-CCNC: 165 U/L (ref 39–117)
ALT SERPL W P-5'-P-CCNC: 40 U/L (ref 1–33)
ANION GAP SERPL CALCULATED.3IONS-SCNC: 9.6 MMOL/L (ref 5–15)
AST SERPL-CCNC: 39 U/L (ref 1–32)
BILIRUB SERPL-MCNC: 0.5 MG/DL (ref 0–1.2)
BUN SERPL-MCNC: 12 MG/DL (ref 8–23)
BUN/CREAT SERPL: 14.6 (ref 7–25)
CALCIUM SPEC-SCNC: 9.5 MG/DL (ref 8.6–10.5)
CHLORIDE SERPL-SCNC: 97 MMOL/L (ref 98–107)
CO2 SERPL-SCNC: 30.4 MMOL/L (ref 22–29)
CREAT SERPL-MCNC: 0.82 MG/DL (ref 0.57–1)
EGFRCR SERPLBLD CKD-EPI 2021: 75.6 ML/MIN/1.73
GLOBULIN UR ELPH-MCNC: 3.1 GM/DL
GLUCOSE SERPL-MCNC: 110 MG/DL (ref 65–99)
POTASSIUM SERPL-SCNC: 4.7 MMOL/L (ref 3.5–5.2)
PROT SERPL-MCNC: 6.8 G/DL (ref 6–8.5)
SODIUM SERPL-SCNC: 137 MMOL/L (ref 136–145)

## 2024-10-22 PROCEDURE — 36415 COLL VENOUS BLD VENIPUNCTURE: CPT

## 2024-10-22 PROCEDURE — 80053 COMPREHEN METABOLIC PANEL: CPT | Performed by: STUDENT IN AN ORGANIZED HEALTH CARE EDUCATION/TRAINING PROGRAM

## 2024-10-28 ENCOUNTER — LAB (OUTPATIENT)
Dept: FAMILY MEDICINE CLINIC | Facility: CLINIC | Age: 74
End: 2024-10-28
Payer: MEDICARE

## 2024-10-28 DIAGNOSIS — M81.0 OSTEOPOROSIS, UNSPECIFIED OSTEOPOROSIS TYPE, UNSPECIFIED PATHOLOGICAL FRACTURE PRESENCE: Primary | ICD-10-CM

## 2024-10-28 LAB — 25(OH)D3 SERPL-MCNC: 69 NG/ML (ref 30–100)

## 2024-10-28 PROCEDURE — 36415 COLL VENOUS BLD VENIPUNCTURE: CPT

## 2024-10-28 PROCEDURE — 82306 VITAMIN D 25 HYDROXY: CPT | Performed by: FAMILY MEDICINE

## 2024-12-13 NOTE — TELEPHONE ENCOUNTER
Caller: Breanne Nicole    Relationship: Self    Best call back number: [phone]    Requested Prescriptions:   Requested Prescriptions     Pending Prescriptions Disp Refills    atorvastatin (LIPITOR) 10 MG tablet 90 tablet 1     Sig: Take 1 tablet by mouth Daily.        Pharmacy where request should be sent: [id number] - [place], [place] - [address] - [phone]  - [phone] FX     Last office visit with prescribing clinician: 3/18/2024   Last telemedicine visit with prescribing clinician: Visit date not found   Next office visit with prescribing clinician: 3/21/2025     Additional details provided by patient: PATIENT HAS APPROX A WEEK    Does the patient have less than a 3 day supply:  [] Yes  [x] No    Would you like a call back once the refill request has been completed: [] Yes [x] No    If the office needs to give you a call back, can they leave a voicemail: [] Yes [x] No    Leela Trimble Rep   04/04/24 13:46 EDT            Pt attended group and able to self reflect.     Problem: Alteration in Thoughts and Perception  Goal: Attend and participate in unit activities, including therapeutic, recreational, and educational groups  Description: Interventions:  -Encourage Visitation and family involvement in care  Outcome: Progressing

## 2025-02-05 ENCOUNTER — READMISSION MANAGEMENT (OUTPATIENT)
Dept: CALL CENTER | Facility: HOSPITAL | Age: 75
End: 2025-02-05
Payer: MEDICARE

## 2025-02-06 ENCOUNTER — TRANSITIONAL CARE MANAGEMENT TELEPHONE ENCOUNTER (OUTPATIENT)
Dept: CALL CENTER | Facility: HOSPITAL | Age: 75
End: 2025-02-06
Payer: MEDICARE

## 2025-02-06 NOTE — OUTREACH NOTE
Prep Survey      Flowsheet Row Responses   [facility] facility patient discharged from? Non-[facility]   Is LACE score < 7 ? Non-BH Discharge   Eligibility [facility]   Date of Admission 02/04/25   Date of Discharge 02/05/25   Discharge Disposition Home or Self Care   Discharge diagnosis Deep brain stimulator insertion   Does the patient have one of the following disease processes/diagnoses(primary or secondary)? General Surgery   Does the patient have Home health ordered? No   Prep survey completed? Yes            BILL ARNOLD - Registered Nurse

## 2025-02-06 NOTE — OUTREACH NOTE
Call Center TCM Note      Flowsheet Row Responses   [facility] patient discharged from? Non-   Does the patient have one of the following disease processes/diagnoses(primary or secondary)? General Surgery   TCM attempt successful? Yes   Call start time 1621   Call end time 1624   Discharge diagnosis Deep brain stimulator insertion   Meds reviewed with patient/caregiver? Yes   Is the patient having any side effects they believe may be caused by any medication additions or changes? No   Does the patient have all medications ordered at discharge? Yes   Is the patient taking all medications as directed (includes completed medication regime)? Yes   Comments Post-op 3.3.25,   Second part of surgery 2/11/25.   Does the patient have an appointment with their PCP within 7-14 days of discharge? No   Nursing Interventions Patient declined scheduling/rescheduling appointment at this time   Psychosocial issues? No   Did the patient receive a copy of their discharge instructions? Yes   Nursing interventions Reviewed instructions with patient   What is the patient's perception of their health status since discharge? Improving   Is the patient/caregiver able to teach back signs and symptoms related to disease process for when to call PCP? Yes   Is the patient/caregiver able to teach back signs and symptoms related to disease process for when to call 911? Yes   Is the patient/caregiver able to teach back the hierarchy of who to call/visit for symptoms/problems? PCP, Specialist, Home health nurse, Urgent Care, ED, 911 Yes   If the patient is a current smoker, are they able to teach back resources for cessation? Not a smoker   TCM call completed? Yes   Wrap up additional comments Pt states she is doing ok and sore at incisional sites. Reviewed warning s/s of infection and when to reach out to surgeon's office. Reviewed new meds with pt. Pt verified post-op appt   Call end time 1624   Would this patient benefit from a Referral  to Amb Social Work? No   Is the patient interested in additional calls from an ambulatory ? No            Hilda Aviles RN    2/6/2025, 16:26 EST

## 2025-03-10 RX ORDER — ATORVASTATIN CALCIUM 10 MG/1
10 TABLET, FILM COATED ORAL DAILY
Qty: 90 TABLET | Refills: 1 | Status: SHIPPED | OUTPATIENT
Start: 2025-03-10

## 2025-04-09 ENCOUNTER — OFFICE VISIT (OUTPATIENT)
Dept: FAMILY MEDICINE CLINIC | Facility: CLINIC | Age: 75
End: 2025-04-09
Payer: MEDICARE

## 2025-04-09 ENCOUNTER — LAB (OUTPATIENT)
Dept: FAMILY MEDICINE CLINIC | Facility: CLINIC | Age: 75
End: 2025-04-09
Payer: MEDICARE

## 2025-04-09 VITALS
HEIGHT: 66 IN | OXYGEN SATURATION: 92 % | DIASTOLIC BLOOD PRESSURE: 62 MMHG | BODY MASS INDEX: 21.05 KG/M2 | HEART RATE: 73 BPM | WEIGHT: 131 LBS | SYSTOLIC BLOOD PRESSURE: 108 MMHG | RESPIRATION RATE: 18 BRPM

## 2025-04-09 DIAGNOSIS — G20.B2 PARKINSON'S DISEASE WITH DYSKINESIA AND FLUCTUATING MANIFESTATIONS: ICD-10-CM

## 2025-04-09 DIAGNOSIS — Z12.11 COLON CANCER SCREENING: ICD-10-CM

## 2025-04-09 DIAGNOSIS — E78.5 HYPERLIPIDEMIA, UNSPECIFIED HYPERLIPIDEMIA TYPE: ICD-10-CM

## 2025-04-09 DIAGNOSIS — F41.1 GENERALIZED ANXIETY DISORDER: ICD-10-CM

## 2025-04-09 DIAGNOSIS — Z00.00 MEDICARE ANNUAL WELLNESS VISIT, SUBSEQUENT: Primary | ICD-10-CM

## 2025-04-09 LAB
CHOLEST SERPL-MCNC: 181 MG/DL (ref 0–200)
HDLC SERPL-MCNC: 79 MG/DL (ref 40–60)
LDLC SERPL CALC-MCNC: 93 MG/DL (ref 0–100)
LDLC/HDLC SERPL: 1.18 {RATIO}
TRIGL SERPL-MCNC: 42 MG/DL (ref 0–150)
VLDLC SERPL-MCNC: 9 MG/DL (ref 5–40)

## 2025-04-09 PROCEDURE — 36415 COLL VENOUS BLD VENIPUNCTURE: CPT | Performed by: STUDENT IN AN ORGANIZED HEALTH CARE EDUCATION/TRAINING PROGRAM

## 2025-04-09 PROCEDURE — 80061 LIPID PANEL: CPT | Performed by: STUDENT IN AN ORGANIZED HEALTH CARE EDUCATION/TRAINING PROGRAM

## 2025-04-09 NOTE — PROGRESS NOTES
Subjective   The ABCs of the Annual Wellness Visit  Medicare Wellness Visit    Breanne Nicole is a 75 y.o. patient who presents for a Medicare Wellness Visit.    The following portions of the patient's history were reviewed and   updated as appropriate: allergies, current medications, past family history, past medical history, past social history, past surgical history, and problem list.    Compared to one year ago, the patient's physical   health is worse.  Compared to one year ago, the patient's mental   health is the same.    Recent Hospitalizations:  She was admitted within the past 365 days at [facility].     Current Medical Providers:  Patient Care Team:  Alvaro Avelar DO as PCP - General (Family Medicine)  Zaire Ohara MD as Consulting Physician (Cardiology)  Tobias Laws MD as Consulting Physician (Neurology)  Claudia Whiting DO (Sports Medicine)    Outpatient Medications Prior to Visit   Medication Sig Dispense Refill    atorvastatin (LIPITOR) 10 MG tablet TAKE 1 TABLET BY MOUTH DAILY 90 tablet 1    calcium carb-cholecalciferol 600-800 MG-UNIT tablet Take 1 tablet by mouth.      escitalopram (LEXAPRO) 10 MG tablet Take 3 tablets by mouth Daily. She takes 30 mg all at night before bed      famotidine (PEPCID) 40 MG tablet Take 1 tablet by mouth Daily As Needed for Heartburn. 30 tablet 1    metroNIDAZOLE (METROGEL) 0.75 % gel APPLY THIN LAYER TOPICALLY TO THE AFFECTED AREA TWICE DAILY IN THE MORNING AND IN THE EVENING FOR ROSACEA      polyethylene glycol (MIRALAX) packet Take 17 g by mouth. Has to take all the time or she isn't able to go/      VITAMIN D PO Take  by mouth.      amantadine (SYMMETREL) 100 MG capsule Take 1 capsule by mouth 3 (Three) Times a Day. (Patient not taking: Reported on 4/9/2025)      carbidopa-levodopa (SINEMET)  MG per tablet       Cyanocobalamin (VITAMIN B-12 PO) Take  by mouth. (Patient not taking: Reported on 4/9/2025)       No  "facility-administered medications prior to visit.     No opioid medication identified on active medication list. I have reviewed chart for other potential  high risk medication/s and harmful drug interactions in the elderly.      Aspirin is not on active medication list.  Aspirin use is not indicated based on review of current medical condition/s. Risk of harm outweighs potential benefits.  .    Patient Active Problem List   Diagnosis    Coronary artery disease involving native coronary artery without angina pectoris    Chest pain    Chest pressure    Heart murmur    Hyperlipidemia    Fatigue    Family history of coronary artery disease    Shortness of breath    Palpitations    Allergic state    Anemia    B-cell lymphoma    Constipation    Medicare annual wellness visit, subsequent    Generalized anxiety disorder    Irritable bowel syndrome    Osteoarthritis    Osteoporosis    Parkinson's disease    Rosacea    Anxiety about health    Dystonia    Tremor    Vitamin D deficiency    Ascending aorta dilatation     Advance Care Planning Advance Directive is on file.  ACP discussion was held with the patient during this visit. Patient has an advance directive in EMR which is still valid.             Objective   Vitals:    04/09/25 0924   BP: 108/62   Pulse: 73   Resp: 18   SpO2: 92%   Weight: 59.4 kg (131 lb)   Height: 167.6 cm (66\")   PainSc: 0-No pain       Estimated body mass index is 21.14 kg/m² as calculated from the following:    Height as of this encounter: 167.6 cm (66\").    Weight as of this encounter: 59.4 kg (131 lb).    BMI is within normal parameters. No other follow-up for BMI required.           Does the patient have evidence of cognitive impairment? No                                                                                                Health  Risk Assessment    Smoking Status:  Social History     Tobacco Use   Smoking Status Never    Passive exposure: Never   Smokeless Tobacco Never     Alcohol " Consumption:  Social History     Substance and Sexual Activity   Alcohol Use Yes    Alcohol/week: 1.0 standard drink of alcohol    Types: 1 Glasses of wine per week    Comment: i rarely drink       Fall Risk Screen  NANCY Fall Risk Assessment was completed, and patient is at LOW risk for falls.Assessment completed on:2025    Depression Screening   Little interest or pleasure in doing things? Not at all   Feeling down, depressed, or hopeless? Not at all   PHQ-2 Total Score 0      Health Habits and Functional and Cognitive Screenin/2/2025     8:22 AM   Functional & Cognitive Status   Do you have difficulty preparing food and eating? No   Do you have difficulty bathing yourself, getting dressed or grooming yourself? No   Do you have difficulty using the toilet? No   Do you have difficulty moving around from place to place? No   Do you have trouble with steps or getting out of a bed or a chair? Yes   Current Diet Well Balanced Diet   Dental Exam Up to date   Eye Exam Up to date   Exercise (times per week) 4 times per week   Current Exercises Include Hiking;Home Exercise Program (TV, Computer, Etc.);Stationary Bicycling/Spin Class;Walking   Do you need help using the phone?  No   Are you deaf or do you have serious difficulty hearing?  No   Do you need help to go to places out of walking distance? Yes   Do you need help shopping? No   Do you need help preparing meals?  No   Do you need help with housework?  No   Do you need help with laundry? No   Do you need help taking your medications? No   Do you need help managing money? No   Do you ever drive or ride in a car without wearing a seat belt? No   Have you felt unusual stress, anger or loneliness in the last month? No   Who do you live with? Spouse   If you need help, do you have trouble finding someone available to you? No   Have you been bothered in the last four weeks by sexual problems? No   Do you have difficulty concentrating, remembering or making  "decisions? No           Age-appropriate Screening Schedule:  Refer to the list below for future screening recommendations based on patient's age, sex and/or medical conditions. Orders for these recommended tests are listed in the plan section. The patient has been provided with a written plan.    Health Maintenance List  Health Maintenance   Topic Date Due    TDAP/TD VACCINES (1 - Tdap) Never done    ZOSTER VACCINE (2 of 2) 12/06/2018    COVID-19 Vaccine (5 - 2024-25 season) 09/01/2024    COLORECTAL CANCER SCREENING  12/16/2024    LIPID PANEL  03/25/2025    DXA SCAN  05/31/2025    INFLUENZA VACCINE  07/01/2025    MAMMOGRAM  02/20/2026    ANNUAL WELLNESS VISIT  04/09/2026    HEPATITIS C SCREENING  Completed    Pneumococcal Vaccine 50+  Completed                                                                                                                                                CMS Preventative Services Quick Reference  Risk Factors Identified During Encounter  Immunizations Discussed/Encouraged: Shingrix    The above risks/problems have been discussed with the patient.  Pertinent information has been shared with the patient in the After Visit Summary.  An After Visit Summary and PPPS were made available to the patient.    Follow Up:   Next Medicare Wellness visit to be scheduled in 1 year.         Additional E&M Note during same encounter follows:  Patient has additional, significant, and separately identifiable condition(s)/problem(s) that require work above and beyond the Medicare Wellness Visit     Chief Complaint  Medicare Wellness-subsequent    Subjective   HPI  Breanne is also being seen today for additional medical problem/s.  Good intake of fruits and vegetables.    Drinks mostly water.  Sleep is good.  9-10 hrs / night.    Recently had DBS surgery so just now getting back to exercise.    No recent falls.        Objective   Vital Signs:  /62   Pulse 73   Resp 18   Ht 167.6 cm (66\")   Wt " 59.4 kg (131 lb)   SpO2 92%   BMI 21.14 kg/m²     GEN: In no acute distress, non toxic appearing  HEENT: Pupils equal and reactive to light, sclera clear. Mucous membranes moist. Oropharynx without erythema or exudate. No cervical or submandibular lymphadenopathy.    CV: Regular rate and rhythm, no murmurs, 2+ peripheral pulses, No extremity edema.   RESP: Lungs clear to auscultation anteriorly and posteriorly in all lung fields bilaterally.  NEURO: AAO to person, place, and time. CN 2-12 intact grossly.   PSYCH: Affect normal, insight fair               Assessment and Plan      Medicare annual wellness visit, subsequent  Overall doing well.  Reassuring exam.  BP appropriate today.  Continue to monitor closely.  Drink plenty of water.  Starting PT today for weakness / balance issues.  Discussed importance of minimizing fall risk.  Encourage continued good intake of fruits and vegetables.   DEXA through osteoporosis clinic.  Mammogram next year.  Encouraged to fill out colonoscopy paperwork to get scheduled.  Lipid panel ordered.  Other blood work completed prior to DBS surgery in early Feb.    Orders:    Lipid Panel    Hyperlipidemia, unspecified hyperlipidemia type  Continue atorvastatin 10 mg daily, check lipids.         Generalized anxiety disorder  Continue lexapro 30 mg daily         Parkinson's disease with dyskinesia and fluctuating manifestations  Continue f/u with neuro.         Colon cancer screening              Follow Up   Return in about 1 year (around 4/9/2026) for Medicare Wellness.  Patient was given instructions and counseling regarding her condition or for health maintenance advice. Please see specific information pulled into the AVS if appropriate.

## 2025-04-09 NOTE — ASSESSMENT & PLAN NOTE
Overall doing well.  Reassuring exam.  BP appropriate today.  Continue to monitor closely.  Drink plenty of water.  Starting PT today for weakness / balance issues.  Discussed importance of minimizing fall risk.  Encourage continued good intake of fruits and vegetables.   DEXA through osteoporosis clinic.  Mammogram next year.  Encouraged to fill out colonoscopy paperwork to get scheduled.  Lipid panel ordered.  Other blood work completed prior to DBS surgery in early Feb.    Orders:    Lipid Panel

## 2025-06-06 ENCOUNTER — TELEPHONE (OUTPATIENT)
Dept: FAMILY MEDICINE CLINIC | Facility: CLINIC | Age: 75
End: 2025-06-06

## 2025-06-06 NOTE — TELEPHONE ENCOUNTER
Caller: Breanne Nicole    Relationship: Self    Best call back number:     [phone] (Mobile)       What orders are you requesting (i.e. lab or imaging): DEXA SCAN       In what timeframe would the patient need to come in:     Where will you receive your lab/imaging services: [facility] / [facility]   FAX NUMBER [phone]      Additional notes:

## 2025-06-09 DIAGNOSIS — Z78.0 POSTMENOPAUSE: ICD-10-CM

## 2025-06-18 ENCOUNTER — HOSPITAL ENCOUNTER (OUTPATIENT)
Dept: BONE DENSITY | Facility: HOSPITAL | Age: 75
Discharge: HOME | End: 2025-06-18
Admitting: NURSE PRACTITIONER
Payer: MEDICARE

## 2025-06-18 DIAGNOSIS — Z78.0 POSTMENOPAUSE: ICD-10-CM

## 2025-06-18 PROCEDURE — 77080 DXA BONE DENSITY AXIAL: CPT
